# Patient Record
Sex: FEMALE | Race: WHITE | HISPANIC OR LATINO
[De-identification: names, ages, dates, MRNs, and addresses within clinical notes are randomized per-mention and may not be internally consistent; named-entity substitution may affect disease eponyms.]

---

## 2017-03-29 ENCOUNTER — APPOINTMENT (OUTPATIENT)
Dept: ULTRASOUND IMAGING | Facility: HOSPITAL | Age: 82
End: 2017-03-29

## 2017-03-29 ENCOUNTER — APPOINTMENT (OUTPATIENT)
Dept: RADIOLOGY | Facility: HOSPITAL | Age: 82
End: 2017-03-29

## 2017-03-29 ENCOUNTER — OUTPATIENT (OUTPATIENT)
Dept: OUTPATIENT SERVICES | Facility: HOSPITAL | Age: 82
LOS: 1 days | End: 2017-03-29
Payer: MEDICAID

## 2017-03-29 PROCEDURE — 76856 US EXAM PELVIC COMPLETE: CPT

## 2017-03-29 PROCEDURE — 77080 DXA BONE DENSITY AXIAL: CPT

## 2017-03-29 PROCEDURE — 76830 TRANSVAGINAL US NON-OB: CPT

## 2017-04-24 ENCOUNTER — APPOINTMENT (OUTPATIENT)
Dept: ULTRASOUND IMAGING | Facility: HOSPITAL | Age: 82
End: 2017-04-24

## 2017-04-24 ENCOUNTER — OUTPATIENT (OUTPATIENT)
Dept: OUTPATIENT SERVICES | Facility: HOSPITAL | Age: 82
LOS: 1 days | End: 2017-04-24
Payer: MEDICAID

## 2017-04-24 PROCEDURE — 93971 EXTREMITY STUDY: CPT

## 2017-04-24 PROCEDURE — 93971 EXTREMITY STUDY: CPT | Mod: 26,RT

## 2017-05-12 ENCOUNTER — OUTPATIENT (OUTPATIENT)
Dept: OUTPATIENT SERVICES | Facility: HOSPITAL | Age: 82
LOS: 1 days | End: 2017-05-12
Payer: MEDICAID

## 2017-05-12 ENCOUNTER — APPOINTMENT (OUTPATIENT)
Dept: MRI IMAGING | Facility: HOSPITAL | Age: 82
End: 2017-05-12

## 2017-05-12 PROCEDURE — A9579: CPT

## 2017-05-12 PROCEDURE — 72197 MRI PELVIS W/O & W/DYE: CPT

## 2017-07-05 ENCOUNTER — APPOINTMENT (OUTPATIENT)
Dept: ULTRASOUND IMAGING | Facility: HOSPITAL | Age: 82
End: 2017-07-05

## 2017-07-05 ENCOUNTER — OUTPATIENT (OUTPATIENT)
Dept: OUTPATIENT SERVICES | Facility: HOSPITAL | Age: 82
LOS: 1 days | End: 2017-07-05
Payer: MEDICAID

## 2017-07-05 PROCEDURE — 76830 TRANSVAGINAL US NON-OB: CPT

## 2017-07-05 PROCEDURE — 76856 US EXAM PELVIC COMPLETE: CPT

## 2017-07-29 ENCOUNTER — RESULT REVIEW (OUTPATIENT)
Age: 82
End: 2017-07-29

## 2017-08-08 ENCOUNTER — APPOINTMENT (OUTPATIENT)
Dept: ULTRASOUND IMAGING | Facility: HOSPITAL | Age: 82
End: 2017-08-08
Payer: MEDICAID

## 2017-08-08 ENCOUNTER — OUTPATIENT (OUTPATIENT)
Dept: OUTPATIENT SERVICES | Facility: HOSPITAL | Age: 82
LOS: 1 days | End: 2017-08-08
Payer: MEDICAID

## 2017-08-08 PROCEDURE — 76830 TRANSVAGINAL US NON-OB: CPT

## 2017-08-08 PROCEDURE — 76830 TRANSVAGINAL US NON-OB: CPT | Mod: 26

## 2017-10-23 ENCOUNTER — APPOINTMENT (OUTPATIENT)
Dept: ORTHOPEDIC SURGERY | Facility: CLINIC | Age: 82
End: 2017-10-23

## 2018-01-30 ENCOUNTER — OUTPATIENT (OUTPATIENT)
Dept: OUTPATIENT SERVICES | Facility: HOSPITAL | Age: 83
LOS: 1 days | End: 2018-01-30
Payer: MEDICAID

## 2018-01-30 ENCOUNTER — APPOINTMENT (OUTPATIENT)
Dept: ULTRASOUND IMAGING | Facility: HOSPITAL | Age: 83
End: 2018-01-30
Payer: MEDICAID

## 2018-01-30 DIAGNOSIS — R84.9 UNSPECIFIED ABNORMAL FINDING IN SPECIMENS FROM RESPIRATORY ORGANS AND THORAX: ICD-10-CM

## 2018-01-30 PROCEDURE — 76536 US EXAM OF HEAD AND NECK: CPT

## 2018-01-30 PROCEDURE — 76536 US EXAM OF HEAD AND NECK: CPT | Mod: 26

## 2018-03-19 ENCOUNTER — OUTPATIENT (OUTPATIENT)
Dept: OUTPATIENT SERVICES | Facility: HOSPITAL | Age: 83
LOS: 1 days | End: 2018-03-19
Payer: MEDICAID

## 2018-03-19 ENCOUNTER — APPOINTMENT (OUTPATIENT)
Dept: MAMMOGRAPHY | Facility: HOSPITAL | Age: 83
End: 2018-03-19

## 2018-03-19 ENCOUNTER — APPOINTMENT (OUTPATIENT)
Dept: ULTRASOUND IMAGING | Facility: HOSPITAL | Age: 83
End: 2018-03-19
Payer: MEDICAID

## 2018-03-19 DIAGNOSIS — Z00.8 ENCOUNTER FOR OTHER GENERAL EXAMINATION: ICD-10-CM

## 2018-03-19 PROCEDURE — 76830 TRANSVAGINAL US NON-OB: CPT

## 2018-03-19 PROCEDURE — 77067 SCR MAMMO BI INCL CAD: CPT | Mod: 26

## 2018-03-19 PROCEDURE — 76641 ULTRASOUND BREAST COMPLETE: CPT | Mod: 26

## 2018-03-19 PROCEDURE — 77063 BREAST TOMOSYNTHESIS BI: CPT | Mod: 26

## 2018-03-19 PROCEDURE — 76830 TRANSVAGINAL US NON-OB: CPT | Mod: 26

## 2018-03-19 PROCEDURE — 77067 SCR MAMMO BI INCL CAD: CPT

## 2018-03-19 PROCEDURE — 77063 BREAST TOMOSYNTHESIS BI: CPT

## 2018-03-19 PROCEDURE — 76641 ULTRASOUND BREAST COMPLETE: CPT

## 2018-05-08 ENCOUNTER — OUTPATIENT (OUTPATIENT)
Dept: OUTPATIENT SERVICES | Facility: HOSPITAL | Age: 83
LOS: 1 days | End: 2018-05-08
Payer: MEDICAID

## 2018-05-08 ENCOUNTER — APPOINTMENT (OUTPATIENT)
Dept: RADIOLOGY | Facility: HOSPITAL | Age: 83
End: 2018-05-08
Payer: MEDICAID

## 2018-05-08 DIAGNOSIS — Z00.8 ENCOUNTER FOR OTHER GENERAL EXAMINATION: ICD-10-CM

## 2018-05-08 PROCEDURE — 73130 X-RAY EXAM OF HAND: CPT

## 2018-05-08 PROCEDURE — 73130 X-RAY EXAM OF HAND: CPT | Mod: 26,LT

## 2018-05-15 ENCOUNTER — OUTPATIENT (OUTPATIENT)
Dept: OUTPATIENT SERVICES | Facility: HOSPITAL | Age: 83
LOS: 1 days | End: 2018-05-15
Payer: MEDICAID

## 2018-05-15 ENCOUNTER — APPOINTMENT (OUTPATIENT)
Dept: ULTRASOUND IMAGING | Facility: HOSPITAL | Age: 83
End: 2018-05-15
Payer: MEDICAID

## 2018-05-15 DIAGNOSIS — Z00.8 ENCOUNTER FOR OTHER GENERAL EXAMINATION: ICD-10-CM

## 2018-05-15 PROCEDURE — 93880 EXTRACRANIAL BILAT STUDY: CPT | Mod: 26

## 2018-05-15 PROCEDURE — 93880 EXTRACRANIAL BILAT STUDY: CPT

## 2018-05-29 ENCOUNTER — TRANSCRIPTION ENCOUNTER (OUTPATIENT)
Age: 83
End: 2018-05-29

## 2018-06-29 ENCOUNTER — RESULT REVIEW (OUTPATIENT)
Age: 83
End: 2018-06-29

## 2018-07-23 ENCOUNTER — APPOINTMENT (OUTPATIENT)
Dept: ORTHOPEDIC SURGERY | Facility: CLINIC | Age: 83
End: 2018-07-23
Payer: MEDICAID

## 2018-07-23 VITALS
HEART RATE: 68 BPM | BODY MASS INDEX: 19.74 KG/M2 | SYSTOLIC BLOOD PRESSURE: 116 MMHG | WEIGHT: 110 LBS | DIASTOLIC BLOOD PRESSURE: 68 MMHG | HEIGHT: 62.5 IN

## 2018-07-23 DIAGNOSIS — Z87.19 PERSONAL HISTORY OF OTHER DISEASES OF THE DIGESTIVE SYSTEM: ICD-10-CM

## 2018-07-23 DIAGNOSIS — M75.50 BURSITIS OF UNSPECIFIED SHOULDER: ICD-10-CM

## 2018-07-23 DIAGNOSIS — M25.519 PAIN IN UNSPECIFIED SHOULDER: ICD-10-CM

## 2018-07-23 DIAGNOSIS — M19.049 PRIMARY OSTEOARTHRITIS, UNSPECIFIED HAND: ICD-10-CM

## 2018-07-23 PROCEDURE — 73030 X-RAY EXAM OF SHOULDER: CPT | Mod: RT

## 2018-07-23 PROCEDURE — 99214 OFFICE O/P EST MOD 30 MIN: CPT

## 2018-07-23 PROCEDURE — 73130 X-RAY EXAM OF HAND: CPT | Mod: LT

## 2018-07-30 ENCOUNTER — APPOINTMENT (OUTPATIENT)
Dept: ULTRASOUND IMAGING | Facility: HOSPITAL | Age: 83
End: 2018-07-30
Payer: MEDICAID

## 2018-07-30 ENCOUNTER — OUTPATIENT (OUTPATIENT)
Dept: OUTPATIENT SERVICES | Facility: HOSPITAL | Age: 83
LOS: 1 days | End: 2018-07-30
Payer: MEDICAID

## 2018-07-30 DIAGNOSIS — Z00.8 ENCOUNTER FOR OTHER GENERAL EXAMINATION: ICD-10-CM

## 2018-07-30 PROCEDURE — 76770 US EXAM ABDO BACK WALL COMP: CPT

## 2018-07-30 PROCEDURE — 76770 US EXAM ABDO BACK WALL COMP: CPT | Mod: 26

## 2018-10-04 ENCOUNTER — APPOINTMENT (OUTPATIENT)
Dept: ULTRASOUND IMAGING | Facility: HOSPITAL | Age: 83
End: 2018-10-04
Payer: MEDICAID

## 2018-10-04 ENCOUNTER — OUTPATIENT (OUTPATIENT)
Dept: OUTPATIENT SERVICES | Facility: HOSPITAL | Age: 83
LOS: 1 days | End: 2018-10-04
Payer: MEDICAID

## 2018-10-04 DIAGNOSIS — N85.00 ENDOMETRIAL HYPERPLASIA, UNSPECIFIED: ICD-10-CM

## 2018-10-04 PROCEDURE — 76830 TRANSVAGINAL US NON-OB: CPT

## 2018-10-04 PROCEDURE — 76830 TRANSVAGINAL US NON-OB: CPT | Mod: 26

## 2018-11-01 ENCOUNTER — APPOINTMENT (OUTPATIENT)
Dept: CARDIOLOGY | Facility: CLINIC | Age: 83
End: 2018-11-01
Payer: MEDICAID

## 2018-11-01 ENCOUNTER — NON-APPOINTMENT (OUTPATIENT)
Age: 83
End: 2018-11-01

## 2018-11-01 VITALS
DIASTOLIC BLOOD PRESSURE: 75 MMHG | OXYGEN SATURATION: 97 % | WEIGHT: 116 LBS | RESPIRATION RATE: 17 BRPM | HEIGHT: 62 IN | SYSTOLIC BLOOD PRESSURE: 114 MMHG | HEART RATE: 83 BPM | BODY MASS INDEX: 21.35 KG/M2

## 2018-11-01 PROCEDURE — 99244 OFF/OP CNSLTJ NEW/EST MOD 40: CPT

## 2018-11-01 PROCEDURE — 93000 ELECTROCARDIOGRAM COMPLETE: CPT

## 2018-11-26 ENCOUNTER — APPOINTMENT (OUTPATIENT)
Dept: CARDIOLOGY | Facility: CLINIC | Age: 83
End: 2018-11-26

## 2018-12-13 ENCOUNTER — APPOINTMENT (OUTPATIENT)
Dept: OBGYN | Facility: CLINIC | Age: 83
End: 2018-12-13
Payer: MEDICAID

## 2018-12-13 VITALS
HEIGHT: 62 IN | HEART RATE: 78 BPM | DIASTOLIC BLOOD PRESSURE: 76 MMHG | SYSTOLIC BLOOD PRESSURE: 112 MMHG | RESPIRATION RATE: 16 BRPM | WEIGHT: 115 LBS | BODY MASS INDEX: 21.16 KG/M2 | OXYGEN SATURATION: 96 %

## 2018-12-13 DIAGNOSIS — R19.00 INTRA-ABDOMINAL AND PELVIC SWELLING, MASS AND LUMP, UNSPECIFIED SITE: ICD-10-CM

## 2018-12-13 DIAGNOSIS — Z84.81 FAMILY HISTORY OF CARRIER OF GENETIC DISEASE: ICD-10-CM

## 2018-12-13 DIAGNOSIS — Z01.419 ENCOUNTER FOR GYNECOLOGICAL EXAMINATION (GENERAL) (ROUTINE) W/OUT ABNORMAL FINDINGS: ICD-10-CM

## 2018-12-13 DIAGNOSIS — Z63.4 DISAPPEARANCE AND DEATH OF FAMILY MEMBER: ICD-10-CM

## 2018-12-13 PROCEDURE — 99213 OFFICE O/P EST LOW 20 MIN: CPT | Mod: 25

## 2018-12-13 PROCEDURE — 99387 INIT PM E/M NEW PAT 65+ YRS: CPT

## 2018-12-13 SDOH — SOCIAL STABILITY - SOCIAL INSECURITY: DISSAPEARANCE AND DEATH OF FAMILY MEMBER: Z63.4

## 2018-12-14 LAB — HPV HIGH+LOW RISK DNA PNL CVX: NOT DETECTED

## 2018-12-18 ENCOUNTER — APPOINTMENT (OUTPATIENT)
Dept: OBGYN | Facility: CLINIC | Age: 83
End: 2018-12-18
Payer: MEDICAID

## 2018-12-18 ENCOUNTER — ASOB RESULT (OUTPATIENT)
Age: 83
End: 2018-12-18

## 2018-12-18 LAB — CYTOLOGY CVX/VAG DOC THIN PREP: NORMAL

## 2018-12-18 PROCEDURE — 76830 TRANSVAGINAL US NON-OB: CPT

## 2018-12-19 ENCOUNTER — APPOINTMENT (OUTPATIENT)
Dept: CARDIOLOGY | Facility: CLINIC | Age: 83
End: 2018-12-19
Payer: MEDICAID

## 2018-12-19 DIAGNOSIS — I47.1 SUPRAVENTRICULAR TACHYCARDIA: ICD-10-CM

## 2018-12-19 PROCEDURE — 93306 TTE W/DOPPLER COMPLETE: CPT

## 2018-12-19 PROCEDURE — 93015 CV STRESS TEST SUPVJ I&R: CPT

## 2019-01-17 ENCOUNTER — APPOINTMENT (OUTPATIENT)
Dept: OBGYN | Facility: CLINIC | Age: 84
End: 2019-01-17
Payer: MEDICAID

## 2019-01-17 ENCOUNTER — NON-APPOINTMENT (OUTPATIENT)
Age: 84
End: 2019-01-17

## 2019-01-17 ENCOUNTER — APPOINTMENT (OUTPATIENT)
Dept: CARDIOLOGY | Facility: CLINIC | Age: 84
End: 2019-01-17
Payer: MEDICAID

## 2019-01-17 VITALS
RESPIRATION RATE: 17 BRPM | WEIGHT: 111 LBS | DIASTOLIC BLOOD PRESSURE: 82 MMHG | OXYGEN SATURATION: 99 % | BODY MASS INDEX: 20.43 KG/M2 | HEIGHT: 62 IN | SYSTOLIC BLOOD PRESSURE: 132 MMHG | HEART RATE: 71 BPM

## 2019-01-17 VITALS
HEIGHT: 62 IN | SYSTOLIC BLOOD PRESSURE: 116 MMHG | DIASTOLIC BLOOD PRESSURE: 70 MMHG | BODY MASS INDEX: 20.43 KG/M2 | WEIGHT: 111 LBS | OXYGEN SATURATION: 98 % | HEART RATE: 70 BPM

## 2019-01-17 PROCEDURE — 99215 OFFICE O/P EST HI 40 MIN: CPT

## 2019-01-17 PROCEDURE — 99214 OFFICE O/P EST MOD 30 MIN: CPT

## 2019-01-17 PROCEDURE — 93000 ELECTROCARDIOGRAM COMPLETE: CPT

## 2019-01-17 RX ORDER — MELOXICAM 15 MG/1
15 TABLET ORAL DAILY
Qty: 30 | Refills: 1 | Status: DISCONTINUED | COMMUNITY
Start: 2018-07-24 | End: 2019-01-17

## 2019-01-17 NOTE — CHIEF COMPLAINT
[Follow Up] : follow up GYN visit [FreeTextEntry1] : Here today to discuss genetic test result BRCA 1 pos--copy given to pt--extensive rev .\par Pt opt at this time to pursue D/Chysteroscopy for PMPB /EP---if find CA will proceed with NIRMALA/BSO but does not want laparoscopic BSO at time of D/C.\par Pt to see dr. Spangler in consultation as well.\par Disc testing of children/siblings/grandchildren as well.\par Disc risk reducing surgery /surveillance

## 2019-01-17 NOTE — PHYSICAL EXAM
[General Appearance - Well Developed] : well developed [Normal Appearance] : normal appearance [Well Groomed] : well groomed [General Appearance - Well Nourished] : well nourished [No Deformities] : no deformities [General Appearance - In No Acute Distress] : no acute distress [Normal Conjunctiva] : the conjunctiva exhibited no abnormalities [Eyelids - No Xanthelasma] : the eyelids demonstrated no xanthelasmas [Normal Oral Mucosa] : normal oral mucosa [No Oral Pallor] : no oral pallor [No Oral Cyanosis] : no oral cyanosis [Normal Jugular Venous A Waves Present] : normal jugular venous A waves present [Normal Jugular Venous V Waves Present] : normal jugular venous V waves present [No Jugular Venous Serrano A Waves] : no jugular venous serrano A waves [Respiration, Rhythm And Depth] : normal respiratory rhythm and effort [Exaggerated Use Of Accessory Muscles For Inspiration] : no accessory muscle use [Auscultation Breath Sounds / Voice Sounds] : lungs were clear to auscultation bilaterally [Abdomen Soft] : soft [Abdomen Tenderness] : non-tender [Abdomen Mass (___ Cm)] : no abdominal mass palpated [Abnormal Walk] : normal gait [Gait - Sufficient For Exercise Testing] : the gait was sufficient for exercise testing [Nail Clubbing] : no clubbing of the fingernails [Cyanosis, Localized] : no localized cyanosis [Petechial Hemorrhages (___cm)] : no petechial hemorrhages [Skin Color & Pigmentation] : normal skin color and pigmentation [] : no rash [No Venous Stasis] : no venous stasis [Skin Lesions] : no skin lesions [No Skin Ulcers] : no skin ulcer [No Xanthoma] : no  xanthoma was observed [Oriented To Time, Place, And Person] : oriented to person, place, and time [Affect] : the affect was normal [Mood] : the mood was normal [No Anxiety] : not feeling anxious [Normal Rate] : normal [Normal S1] : normal S1 [Normal S2] : normal S2 [S3] : no S3 [S4] : no S4 [No Murmur] : no murmurs heard [Right Carotid Bruit] : no bruit heard over the right carotid [Left Carotid Bruit] : no bruit heard over the left carotid [Right Femoral Bruit] : no bruit heard over the right femoral artery [Left Femoral Bruit] : no bruit heard over the left femoral artery [2+] : left 2+ [No Abnormalities] : the abdominal aorta was not enlarged and no bruit was heard [No Pitting Edema] : no pitting edema present

## 2019-01-17 NOTE — COUNSELING
[Breast Self Exam] : breast self exam [Nutrition] : nutrition [Exercise] : exercise [Vitamins/Supplements] : vitamins/supplements [Pre/Post Op Instructions] : pre/post op instructions

## 2019-01-17 NOTE — CARDIOLOGY SUMMARY
[No Ischemia] : no Ischemia [___] : [unfilled] [LVEF ___%] : LVEF [unfilled]% [None] : no pulmonary hypertension [Enlarged] : enlarged LA size [Moderate] : moderate mitral regurgitation

## 2019-01-17 NOTE — DISCUSSION/SUMMARY
[Procedure Intermediate Risk] : the procedure risk is intermediate [Patient Intermediate Risk] : the patient is an intermediate risk [Optimized for Surgery] : the patient is optimized for surgery [As per surgery] : as per surgery [Continue] : Continue medications as currently directed [FreeTextEntry1] : cardiac monitor intra-op for APCs

## 2019-01-17 NOTE — HISTORY OF PRESENT ILLNESS
[Preoperative Visit] : for a medical evaluation prior to surgery [Scheduled Procedure ___] : a [unfilled] [Date of Surgery ___] : on [unfilled] [Surgeon Name ___] : surgeon: [unfilled] [Fever] : no fever [Chills] : no chills [Fatigue] : no fatigue [Chest Pain] : no chest pain [Cough] : no cough [Dyspnea] : no dyspnea [Dysuria] : no dysuria [Urinary Frequency] : no urinary frequency [Nausea] : no nausea [Vomiting] : no vomiting [Diarrhea] : no diarrhea [Abdominal Pain] : no abdominal pain [Easy Bruising] : no easy bruising [Lower Extremity Swelling] : no lower extremity swelling [Poor Exercise Tolerance] : no poor exercise tolerance [Diabetes] : no diabetes [Pulmonary Disease] : no pulmonary disease [Anti-Platelet Agents] : no anti-platelet agents [Nicotine Dependence] : no nicotine dependence [Alcohol Use] : no  alcohol use [Renal Disease] : no renal disease [GI Disease] : no gastrointestinal disease [Sleep Apnea] : no sleep apnea [Thromboembolic Problems] : no thromboembolic problems [Frequent use of NSAIDs] : no use of NSAIDs [Transfusion Reaction] : no transfusion reaction [Impaired Immunity] : no impaired immunity [Steroid Use in Last 6 Months] : no steroid use in the last six months [Frequent Aspirin Use] : no frequent aspirin use [Prior Anesthesia] : Prior anesthesia [Prev Anesthesia Reaction] : no previous anesthesia reaction [Electrocardiogram] : ~T an ECG ~C was performed [Echocardiogram] : ~T an echocardiogram ~C was performed [Cardiovascular Stress Test] : a cardiac stress test ~T ~C was performed [Good] : Good [Anesthesia Reaction] : no anesthesia reaction [Sudden Death] : no sudden death [Clotting Disorder] : no clotting disorder [Bleeding Disorder] : no bleeding disorder

## 2019-01-21 ENCOUNTER — APPOINTMENT (OUTPATIENT)
Dept: SURGICAL ONCOLOGY | Facility: CLINIC | Age: 84
End: 2019-01-21
Payer: MEDICAID

## 2019-01-21 ENCOUNTER — OTHER (OUTPATIENT)
Age: 84
End: 2019-01-21

## 2019-01-21 VITALS
DIASTOLIC BLOOD PRESSURE: 72 MMHG | HEIGHT: 62 IN | SYSTOLIC BLOOD PRESSURE: 131 MMHG | BODY MASS INDEX: 20.43 KG/M2 | HEART RATE: 70 BPM | WEIGHT: 111 LBS | OXYGEN SATURATION: 99 % | RESPIRATION RATE: 16 BRPM

## 2019-01-21 PROCEDURE — 99244 OFF/OP CNSLTJ NEW/EST MOD 40: CPT

## 2019-01-24 ENCOUNTER — OUTPATIENT (OUTPATIENT)
Dept: OUTPATIENT SERVICES | Facility: HOSPITAL | Age: 84
LOS: 1 days | End: 2019-01-24
Payer: MEDICAID

## 2019-01-24 ENCOUNTER — OUTPATIENT (OUTPATIENT)
Dept: OUTPATIENT SERVICES | Facility: HOSPITAL | Age: 84
LOS: 1 days | Discharge: HOME | End: 2019-01-24

## 2019-01-24 VITALS
SYSTOLIC BLOOD PRESSURE: 130 MMHG | WEIGHT: 108.91 LBS | OXYGEN SATURATION: 99 % | RESPIRATION RATE: 16 BRPM | HEART RATE: 72 BPM | TEMPERATURE: 98 F | DIASTOLIC BLOOD PRESSURE: 80 MMHG | HEIGHT: 62 IN

## 2019-01-24 DIAGNOSIS — H26.9 UNSPECIFIED CATARACT: Chronic | ICD-10-CM

## 2019-01-24 DIAGNOSIS — N84.0 POLYP OF CORPUS UTERI: ICD-10-CM

## 2019-01-24 DIAGNOSIS — K64.9 UNSPECIFIED HEMORRHOIDS: Chronic | ICD-10-CM

## 2019-01-24 DIAGNOSIS — D68.52 PROTHROMBIN GENE MUTATION: ICD-10-CM

## 2019-01-24 DIAGNOSIS — Z90.89 ACQUIRED ABSENCE OF OTHER ORGANS: Chronic | ICD-10-CM

## 2019-01-24 LAB
ANION GAP SERPL CALC-SCNC: 14 MMO/L — SIGNIFICANT CHANGE UP (ref 7–14)
BLD GP AB SCN SERPL QL: NEGATIVE — SIGNIFICANT CHANGE UP
BUN SERPL-MCNC: 16 MG/DL — SIGNIFICANT CHANGE UP (ref 7–23)
CALCIUM SERPL-MCNC: 9.3 MG/DL — SIGNIFICANT CHANGE UP (ref 8.4–10.5)
CHLORIDE SERPL-SCNC: 104 MMOL/L — SIGNIFICANT CHANGE UP (ref 98–107)
CO2 SERPL-SCNC: 23 MMOL/L — SIGNIFICANT CHANGE UP (ref 22–31)
CREAT SERPL-MCNC: 0.85 MG/DL — SIGNIFICANT CHANGE UP (ref 0.5–1.3)
GLUCOSE SERPL-MCNC: 79 MG/DL — SIGNIFICANT CHANGE UP (ref 70–99)
HCT VFR BLD CALC: 38.1 % — SIGNIFICANT CHANGE UP (ref 34.5–45)
HGB BLD-MCNC: 12.1 G/DL — SIGNIFICANT CHANGE UP (ref 11.5–15.5)
MCHC RBC-ENTMCNC: 29 PG — SIGNIFICANT CHANGE UP (ref 27–34)
MCHC RBC-ENTMCNC: 31.8 % — LOW (ref 32–36)
MCV RBC AUTO: 91.4 FL — SIGNIFICANT CHANGE UP (ref 80–100)
NRBC # FLD: 0 K/UL — LOW (ref 25–125)
PLATELET # BLD AUTO: 152 K/UL — SIGNIFICANT CHANGE UP (ref 150–400)
PMV BLD: 11.3 FL — SIGNIFICANT CHANGE UP (ref 7–13)
POTASSIUM SERPL-MCNC: 4.2 MMOL/L — SIGNIFICANT CHANGE UP (ref 3.5–5.3)
POTASSIUM SERPL-SCNC: 4.2 MMOL/L — SIGNIFICANT CHANGE UP (ref 3.5–5.3)
RBC # BLD: 4.17 M/UL — SIGNIFICANT CHANGE UP (ref 3.8–5.2)
RBC # FLD: 13.3 % — SIGNIFICANT CHANGE UP (ref 10.3–14.5)
RH IG SCN BLD-IMP: POSITIVE — SIGNIFICANT CHANGE UP
SODIUM SERPL-SCNC: 141 MMOL/L — SIGNIFICANT CHANGE UP (ref 135–145)
WBC # BLD: 4.3 K/UL — SIGNIFICANT CHANGE UP (ref 3.8–10.5)
WBC # FLD AUTO: 4.3 K/UL — SIGNIFICANT CHANGE UP (ref 3.8–10.5)

## 2019-01-24 PROCEDURE — 93010 ELECTROCARDIOGRAM REPORT: CPT

## 2019-01-24 RX ORDER — SODIUM CHLORIDE 9 MG/ML
1000 INJECTION, SOLUTION INTRAVENOUS
Qty: 0 | Refills: 0 | Status: DISCONTINUED | OUTPATIENT
Start: 2019-02-11 | End: 2019-02-12

## 2019-01-24 NOTE — H&P PST ADULT - NEGATIVE CARDIOVASCULAR SYMPTOMS
no chest pain/no claudication/no palpitations/no orthopnea/no paroxysmal nocturnal dyspnea/no peripheral edema/no dyspnea on exertion

## 2019-01-24 NOTE — H&P PST ADULT - HISTORY OF PRESENT ILLNESS
84y/o female presents for preop op eval for scheduled d&c hysteroscopy, possible symphion on 2/11/19.  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  Pt denies symptoms. 82y/o female presents for preop op eval for scheduled d&c hysteroscopy, possible symphion on 2/11/19.  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  Pt denies symptoms.  Pt speaks limited English.

## 2019-01-24 NOTE — H&P PST ADULT - ATTENDING COMMENTS
Pt BRCA 1 pos found to have endomrtrial polyp for resection.D/C hysteroscopy,poss SMPHOION.Episode staining.Pt aware R/B/A,side effects,compls

## 2019-01-24 NOTE — H&P PST ADULT - PROBLEM SELECTOR PLAN 1
preop instructions, gi prophylaxis given  pt verbalized understanding scheduled d&c hysteroscopy, possible symphion on 2/11/19.   preop instructions, gi prophylaxis given  pt verbalized understanding

## 2019-01-24 NOTE — H&P PST ADULT - FAMILY HISTORY
Child  Still living? Unknown  Family history of diabetes mellitus, Age at diagnosis: Age Unknown     Uncle  Still living? Unknown  Family history of diabetes mellitus, Age at diagnosis: Age Unknown     Mother  Still living? Unknown  Family history of breast cancer, Age at diagnosis: Age Unknown     Sibling  Still living? No  Family history of breast cancer, Age at diagnosis: Age Unknown  Family history of oral cancer, Age at diagnosis: Age Unknown     Father  Still living? No  Family history of emphysema, Age at diagnosis: Age Unknown

## 2019-02-04 ENCOUNTER — APPOINTMENT (OUTPATIENT)
Dept: HEMATOLOGY ONCOLOGY | Facility: CLINIC | Age: 84
End: 2019-02-04
Payer: MEDICAID

## 2019-02-04 VITALS
RESPIRATION RATE: 16 BRPM | HEART RATE: 67 BPM | SYSTOLIC BLOOD PRESSURE: 138 MMHG | HEIGHT: 64.76 IN | WEIGHT: 108.02 LBS | BODY MASS INDEX: 18.22 KG/M2 | OXYGEN SATURATION: 100 % | DIASTOLIC BLOOD PRESSURE: 83 MMHG | TEMPERATURE: 98.6 F

## 2019-02-04 PROCEDURE — 99243 OFF/OP CNSLTJ NEW/EST LOW 30: CPT

## 2019-02-10 ENCOUNTER — TRANSCRIPTION ENCOUNTER (OUTPATIENT)
Age: 84
End: 2019-02-10

## 2019-02-10 NOTE — HISTORY OF PRESENT ILLNESS
[de-identified] : Age 83: Color test done on 12/13/18 showing BRCA 1 pathogenic mutation: [id number]_[id number] (p.[id number])\par She has no history of cancer. Her niece had genetic testing for breast cancer and her sister subsequently had testing which showed BRCA1. She also had testing that showed BRCA 1 and has seen breast surgeon, Dr Spangler. She also has been following with GYN.  [de-identified] : She understands the risk of BRCA positivity for future breast and ovarian cancer but does not want to have prophylactic surgery. She is agreeable to breast surveillance and will be seeing GYN for D & C hysteroscopy. She does not want to take any medication since she also concerned about side effects.

## 2019-02-10 NOTE — CONSULT LETTER
[Dear  ___] : Dear  [unfilled], [Consult Letter:] : I had the pleasure of evaluating your patient, [unfilled]. [( Thank you for referring [unfilled] for consultation for _____ )] : Thank you for referring [unfilled] for consultation for [unfilled] [Please see my note below.] : Please see my note below. [Consult Closing:] : Thank you very much for allowing me to participate in the care of this patient.  If you have any questions, please do not hesitate to contact me. [Sincerely,] : Sincerely, [FreeTextEntry2] : Matheus Spangler MD\par [address] [place] [address]\[place] [address] [FreeTextEntry3] : Tushar Bonner MD\par Attending\par [facility]\par  [DrSonia  ___] : Dr. BURGOS

## 2019-02-10 NOTE — REASON FOR VISIT
[Initial Consultation] : an initial consultation [FreeTextEntry2] : evaluation for BRCA 1 positive and risk reduction

## 2019-02-10 NOTE — REVIEW OF SYSTEMS
[Joint Pain] : joint pain [Joint Stiffness] : joint stiffness [Muscle Pain] : no muscle pain [Muscle Weakness] : no muscle weakness [Negative] : Allergic/Immunologic

## 2019-02-10 NOTE — ASSESSMENT
[FreeTextEntry1] : She is a 82 y/o  F with BRCA 1 mutation present to review risk reduction recommendations. We reviewed breast surveillance which she is willing to do. We reviewed that ovarian cancer screening is limited and rationale for laparoscopic BSO surgery. She will consider further surgery after her D & C. She also is scheduled for MRI of the breast. We reviewed that chemoprevention could be offered to decrease risk of breast cancer but the data behind this recommendation is limited. We reviewed the chemoprevention with raloxifene versus tamoxifen. We reviewed the potential side effects of chemoprevention and she is concerned of decreased bone density and arthralgias. She does not want chemoprevention with endocrine therapy at this time. She will continue to follow with breast surgeon and GYN.

## 2019-02-11 ENCOUNTER — OUTPATIENT (OUTPATIENT)
Dept: OUTPATIENT SERVICES | Facility: HOSPITAL | Age: 84
LOS: 1 days | Discharge: HOME | End: 2019-02-11
Payer: MEDICAID

## 2019-02-11 ENCOUNTER — RESULT REVIEW (OUTPATIENT)
Age: 84
End: 2019-02-11

## 2019-02-11 ENCOUNTER — APPOINTMENT (OUTPATIENT)
Dept: OBGYN | Facility: HOSPITAL | Age: 84
End: 2019-02-11

## 2019-02-11 ENCOUNTER — TRANSCRIPTION ENCOUNTER (OUTPATIENT)
Age: 84
End: 2019-02-11

## 2019-02-11 VITALS
HEART RATE: 67 BPM | SYSTOLIC BLOOD PRESSURE: 138 MMHG | OXYGEN SATURATION: 99 % | WEIGHT: 108.91 LBS | DIASTOLIC BLOOD PRESSURE: 72 MMHG | TEMPERATURE: 98 F | RESPIRATION RATE: 16 BRPM | HEIGHT: 62 IN

## 2019-02-11 VITALS
DIASTOLIC BLOOD PRESSURE: 78 MMHG | OXYGEN SATURATION: 99 % | HEART RATE: 74 BPM | TEMPERATURE: 98 F | RESPIRATION RATE: 17 BRPM | SYSTOLIC BLOOD PRESSURE: 133 MMHG

## 2019-02-11 DIAGNOSIS — H26.9 UNSPECIFIED CATARACT: Chronic | ICD-10-CM

## 2019-02-11 DIAGNOSIS — Z90.89 ACQUIRED ABSENCE OF OTHER ORGANS: Chronic | ICD-10-CM

## 2019-02-11 DIAGNOSIS — K64.9 UNSPECIFIED HEMORRHOIDS: Chronic | ICD-10-CM

## 2019-02-11 DIAGNOSIS — N84.0 POLYP OF CORPUS UTERI: ICD-10-CM

## 2019-02-11 LAB — RH IG SCN BLD-IMP: POSITIVE — SIGNIFICANT CHANGE UP

## 2019-02-11 PROCEDURE — 58558 HYSTEROSCOPY BIOPSY: CPT

## 2019-02-11 PROCEDURE — 88305 TISSUE EXAM BY PATHOLOGIST: CPT | Mod: 26

## 2019-02-11 RX ORDER — CHOLECALCIFEROL (VITAMIN D3) 125 MCG
1 CAPSULE ORAL
Qty: 0 | Refills: 0 | COMMUNITY

## 2019-02-11 RX ORDER — ACETAMINOPHEN 500 MG
2 TABLET ORAL
Qty: 0 | Refills: 0 | COMMUNITY

## 2019-02-11 RX ORDER — ALENDRONATE SODIUM 70 MG/1
1 TABLET ORAL
Qty: 0 | Refills: 0 | COMMUNITY

## 2019-02-11 NOTE — BRIEF OPERATIVE NOTE - OPERATION/FINDINGS
atrophic appearing endometrium.Poss endometrial polyp. Small uterus,no adnexal masses, cervix stenotic

## 2019-02-11 NOTE — ASU DISCHARGE PLAN (ADULT/PEDIATRIC). - MEDICATION SUMMARY - MEDICATIONS TO TAKE
I will START or STAY ON the medications listed below when I get home from the hospital:    refresh tears  -- 1 drop(s) in each eye every 3 hours  -- Indication: For home med    magnesium with calcium  -- 1 tab(s) by mouth once a day  -- Indication: For home med    Tylenol 500 mg oral tablet  -- 2 tab(s) by mouth every 6 hours, As Needed  -- Indication: For for pain/cramping, as needed    Fosamax 70 mg oral tablet  -- 1 tab(s) by mouth once a week  -- Indication: For home med    Vitamin D3 2000 intl units oral tablet  -- 1 tab(s) by mouth once a day  -- Indication: For home med

## 2019-02-11 NOTE — ASU DISCHARGE PLAN (ADULT/PEDIATRIC). - NOTIFY
GYN Fever>100.4/Persistent Nausea and Vomiting/Bleeding that does not stop/Pain not relieved by Medications/Inability to Tolerate Liquids or Foods

## 2019-02-11 NOTE — BRIEF OPERATIVE NOTE - PROCEDURE
<<-----Click on this checkbox to enter Procedure Hysteroscopy with dilation and curettage of uterus  02/11/2019    Active  [facility]

## 2019-02-11 NOTE — DISCHARGE NOTE ADULT - INSTRUCTIONS
Nothing in vagina, no intercourse, no douching, no tampons, no tub baths, and no swimming for 2 weeks or until cleared by M.D. no tylenol or acetominophen until after 4:40pm today no advil motrin ibuprofen products until after 4:50pm today

## 2019-02-11 NOTE — ASU DISCHARGE PLAN (ADULT/PEDIATRIC). - ITEMS TO FOLLOWUP WITH YOUR PHYSICIAN'S
Take Motrin and Tylenol as needed for pain and cramping.  Vaginal spotting for the next couple of days is normal.  If heavy vaginal bleeding, foul smelling discharge, fevers, or pain not controlled with oral pain meds, please contact your provider or go to the emergency room.  Nothing in the vagina for the next two weeks. Please call Dr. Reyna's office for a 2 week follow-up.

## 2019-02-11 NOTE — DISCHARGE NOTE ADULT - NS AS ACTIVITY OBS
No Heavy lifting/straining/Walking-Indoors allowed/Do not make important decisions/Do not drive or operate machinery/Showering allowed no driving operating power tools no drinking alcohol no making important decision for the next 12 hours/Showering allowed/Walking-Indoors allowed/Do not make important decisions/Do not drive or operate machinery/No Heavy lifting/straining

## 2019-02-11 NOTE — DISCHARGE NOTE ADULT - PATIENT PORTAL LINK FT
You can access the [facility], offered by [facility] [place], by registering with the following website: [email or web address]

## 2019-02-11 NOTE — DISCHARGE NOTE ADULT - CONDITIONS AT DISCHARGE
F/U with dr. Reyna in 6-8 wks  [phone] F/U with dr. Reyna in 6-8 wks  [phone]    You received IV Tylenol for pain management at 10:40 AM. Please DO NOT take any Tylenol (Acetaminophen) containing products, such as Vicodin, Percocet, Excedrin, and cold medications for the next 6 hours (until 4:40 PM). DO NOT TAKE MORE THAN 3000 MG OF TYLENOL in a 24 hour period.     You received IV Toradol for pain management at 10:50 AM. Please DO NOT take Motrin/Ibuprofen/Advil/Aleve/NSAIDs (Non-Steroidal Anti-Inflammatory Drugs) for the next 6 hours (until 4:50 PM).

## 2019-02-13 LAB — SURGICAL PATHOLOGY STUDY: SIGNIFICANT CHANGE UP

## 2019-02-15 PROBLEM — N84.0 POLYP OF CORPUS UTERI: Chronic | Status: ACTIVE | Noted: 2019-01-24

## 2019-02-15 PROBLEM — E04.1 NONTOXIC SINGLE THYROID NODULE: Chronic | Status: ACTIVE | Noted: 2019-01-24

## 2019-02-15 PROBLEM — I34.1 NONRHEUMATIC MITRAL (VALVE) PROLAPSE: Chronic | Status: ACTIVE | Noted: 2019-01-24

## 2019-02-15 PROBLEM — M81.0 AGE-RELATED OSTEOPOROSIS WITHOUT CURRENT PATHOLOGICAL FRACTURE: Chronic | Status: ACTIVE | Noted: 2019-01-24

## 2019-02-19 ENCOUNTER — FORM ENCOUNTER (OUTPATIENT)
Age: 84
End: 2019-02-19

## 2019-02-20 ENCOUNTER — APPOINTMENT (OUTPATIENT)
Dept: MRI IMAGING | Facility: CLINIC | Age: 84
End: 2019-02-20
Payer: MEDICAID

## 2019-02-20 ENCOUNTER — OUTPATIENT (OUTPATIENT)
Dept: OUTPATIENT SERVICES | Facility: HOSPITAL | Age: 84
LOS: 1 days | End: 2019-02-20
Payer: MEDICAID

## 2019-02-20 DIAGNOSIS — Z00.8 ENCOUNTER FOR OTHER GENERAL EXAMINATION: ICD-10-CM

## 2019-02-20 DIAGNOSIS — Z90.89 ACQUIRED ABSENCE OF OTHER ORGANS: Chronic | ICD-10-CM

## 2019-02-20 DIAGNOSIS — H26.9 UNSPECIFIED CATARACT: Chronic | ICD-10-CM

## 2019-02-20 DIAGNOSIS — K64.9 UNSPECIFIED HEMORRHOIDS: Chronic | ICD-10-CM

## 2019-02-20 PROCEDURE — C8937: CPT

## 2019-02-20 PROCEDURE — 77049 MRI BREAST C-+ W/CAD BI: CPT | Mod: 26

## 2019-02-20 PROCEDURE — A9585: CPT

## 2019-02-20 PROCEDURE — C8908: CPT

## 2019-02-27 ENCOUNTER — APPOINTMENT (OUTPATIENT)
Dept: GYNECOLOGIC ONCOLOGY | Facility: CLINIC | Age: 84
End: 2019-02-27
Payer: MEDICAID

## 2019-02-27 VITALS
WEIGHT: 108.5 LBS | HEART RATE: 76 BPM | HEIGHT: 62 IN | BODY MASS INDEX: 19.96 KG/M2 | DIASTOLIC BLOOD PRESSURE: 80 MMHG | SYSTOLIC BLOOD PRESSURE: 129 MMHG

## 2019-02-27 DIAGNOSIS — Z80.9 FAMILY HISTORY OF MALIGNANT NEOPLASM, UNSPECIFIED: ICD-10-CM

## 2019-02-27 PROCEDURE — 99205 OFFICE O/P NEW HI 60 MIN: CPT | Mod: 25

## 2019-02-27 PROCEDURE — 58100 BIOPSY OF UTERUS LINING: CPT

## 2019-02-28 NOTE — REASON FOR VISIT
[Initial Consultation] : an initial consultation for [Other: _____] : [unfilled] [FreeTextEntry2] : BRCA1 mutation

## 2019-02-28 NOTE — HISTORY OF PRESENT ILLNESS
[de-identified] : 83-year-old lady referred by her gynecologist Dr. Dede REYNOLDS after recently being found to carry a deleterious mutation of the BRCA1 gene.\par \par Previous breast biopsies: None\par \par Sx/symp: none\par \par Reproductive history:\par Menarche was age 15.\par All 3 pregnancies were delivered, the first at age 21.\par Natural menopause at age 54.\par No hormone replacement therapy\par \par She is presently scheduled for a D&C and hysteroscopy (February 2019) for postmenopausal bleeding & endometrial polyp (Dr. Dede Reynolds), but has declined prophylactic BSO.\par \par March 2018, bilateral, mammograms and ultrasounds @[facility] were unremarkable, BI-RADS 2.\par No prior breast MRI\par \par Mother had breast cancer at age 60.\par A sister also had breast cancer at 60, and carries a deleterious BRCA mutation.\par 2 nieces have also had breast cancer\par \par Her brother had cancer of the tongue.\par \par + Ashkenazi\par \par Her internist is Dr. Deandre Owens.\par \par She has frequent urinary tract infections.\par Her urologist is Dr. Antonio Velasco\par Her nephrologist is Dr. Fide Orlando\par \par In the past she saw a hematologist, Dr. Ash Souza for anemia, that assessment was unremarkable.\par \par \par Cardiologist is Dr. Alexander BEEBE.\par She does not have a pacemaker defibrillator.\par She takes no anticoagulants.\par \par She takes ibuprofen and Meloxicam for arthritis.\par She is on Citracal, And Fosamax for osteoporosis.\par She also takes vitamin D\par \par \par She had a preoperative visit (Above) with her gynecologist Dr. Reynolds in January 2019.\par Has also seen Dr Jennifer Paiz\par \par Colonoscopy of 2016 was unremarkable (Dr Raffaele Bonner)

## 2019-02-28 NOTE — ASSESSMENT
[FreeTextEntry1] : We reviewed her increased risk of breast cancer confirmed by the deleterious BRCA1 mutation, and the options of surgical prophylaxis versus careful surveillance, including breast MRIs, accompanied by evaluation for systemic risk reduction therapy options.\par \par Presently she is not interested in pursuing any surgical intervention.\par \par She is agreeable to baseline breast MRI, that prescription was entered today.\par I submitted the discs from her March 2018 imaging at [facility].\par \par If her MRI is unremarkable, she will have annual mammography in March/April 2019 at [facility]...(Below)\par \par Agreeable to meeting with medical oncology to discuss systemic risk reduction therapy options, nurse navigator was contacted\par \par Reviewed in detail.\par \par All questions answered.\par \par Note dictated\par \par \par 02-10-19:\par She met with Dr. Tushar Bonner from medical oncology, declines systemic breast cancer risk reduction therapy, presently.\par \par \par 02-20-19:\par Baseline breast MRI at [place]: BI-RADS-1.\par Consideration will be given to a followup study at an appropriate interval (genetic predisposition)...............................\par The prescriptions for her March/April 2019 annual mammogram and sonogram were entered on 02/28/19

## 2019-02-28 NOTE — REVIEW OF SYSTEMS
[Negative] : Integumentary [de-identified] : Arthritis [de-identified] : Osteoporosis [FreeTextEntry1] : BRCA positive

## 2019-02-28 NOTE — PHYSICAL EXAM
[Normal] : supple, no neck mass and thyroid not enlarged [Normal Neck Lymph Nodes] : normal neck lymph nodes  [Normal Supraclavicular Lymph Nodes] : normal supraclavicular lymph nodes [Normal Axillary Lymph Nodes] : normal axillary lymph nodes [Normal] : normal appearance, no rash, nodules, vesicles, ulcers, erythema [de-identified] : Groins not examined [de-identified] : below

## 2019-03-13 ENCOUNTER — APPOINTMENT (OUTPATIENT)
Dept: GYNECOLOGIC ONCOLOGY | Facility: CLINIC | Age: 84
End: 2019-03-13
Payer: MEDICAID

## 2019-03-13 VITALS
DIASTOLIC BLOOD PRESSURE: 75 MMHG | SYSTOLIC BLOOD PRESSURE: 128 MMHG | HEART RATE: 77 BPM | HEIGHT: 62 IN | BODY MASS INDEX: 21 KG/M2 | WEIGHT: 114.13 LBS

## 2019-03-13 PROCEDURE — 99214 OFFICE O/P EST MOD 30 MIN: CPT

## 2019-03-20 ENCOUNTER — FORM ENCOUNTER (OUTPATIENT)
Age: 84
End: 2019-03-20

## 2019-03-21 ENCOUNTER — APPOINTMENT (OUTPATIENT)
Dept: ULTRASOUND IMAGING | Facility: CLINIC | Age: 84
End: 2019-03-21
Payer: MEDICAID

## 2019-03-21 ENCOUNTER — OUTPATIENT (OUTPATIENT)
Dept: OUTPATIENT SERVICES | Facility: HOSPITAL | Age: 84
LOS: 1 days | End: 2019-03-21
Payer: MEDICAID

## 2019-03-21 ENCOUNTER — APPOINTMENT (OUTPATIENT)
Dept: MAMMOGRAPHY | Facility: CLINIC | Age: 84
End: 2019-03-21
Payer: MEDICAID

## 2019-03-21 DIAGNOSIS — Z00.00 ENCOUNTER FOR GENERAL ADULT MEDICAL EXAMINATION WITHOUT ABNORMAL FINDINGS: ICD-10-CM

## 2019-03-21 DIAGNOSIS — H26.9 UNSPECIFIED CATARACT: Chronic | ICD-10-CM

## 2019-03-21 DIAGNOSIS — K64.9 UNSPECIFIED HEMORRHOIDS: Chronic | ICD-10-CM

## 2019-03-21 DIAGNOSIS — Z90.89 ACQUIRED ABSENCE OF OTHER ORGANS: Chronic | ICD-10-CM

## 2019-03-21 PROCEDURE — 77067 SCR MAMMO BI INCL CAD: CPT | Mod: 26

## 2019-03-21 PROCEDURE — 77063 BREAST TOMOSYNTHESIS BI: CPT | Mod: 26

## 2019-03-21 PROCEDURE — 77067 SCR MAMMO BI INCL CAD: CPT

## 2019-03-21 PROCEDURE — 76641 ULTRASOUND BREAST COMPLETE: CPT

## 2019-03-21 PROCEDURE — 77063 BREAST TOMOSYNTHESIS BI: CPT

## 2019-03-21 PROCEDURE — 76641 ULTRASOUND BREAST COMPLETE: CPT | Mod: 26,50

## 2019-03-23 LAB
CANCER AG125 SERPL-ACNC: 11 U/ML
CORE LAB BIOPSY: NORMAL

## 2019-03-23 NOTE — PROCEDURE
[Endometrial Biopsy] : an endometrial biopsy [Thickened Endometrium] : thickened endometrium [Patient] : the patient [Written consent] : written consent was obtained prior to the procedure and is detailed in the patient's record [Pelvic Pain] : no pelvic pain [Betadine] : betadine [Yes] : the specimen was sent to pathology [No Complications] : none [Tolerated Well] : the patient tolerated the procedure well

## 2019-03-23 NOTE — LETTER BODY
[Dear  ___] : Dear  [unfilled], [I had the pleasure of evaluating your patient, [unfilled] for ___] : I had the pleasure of evaluating your patient, [unfilled] for [unfilled]. [Attached please find my note.] : Attached please find my note. [FreeTextEntry1] : Pathology

## 2019-03-23 NOTE — DISCUSSION/SUMMARY
[Reviewed Clinical Lab Test(s)] : Results of clinical tests were reviewed. [Reviewed Radiology Report(s)] : Radiology reports were reviewed. [Reviewed Radiology Film/Image(s)] : Images from radiology studies were reviewed and examined. [FreeTextEntry1] : We discussed endometrial thickening and we performed an endometrial biopsy today, will f/u results\par \par We discussed with patient that usually prophylactic BSO and hysterectomy is recommended due to this mutation given increased risk of ovarian and uterine serous carcinoma. Given her age, we are not sure the longevity of the benefits of prophylactic surgery at this time, however we do know that the risk of cancer is still present. patient declines any surgical intervention at this time and wants to return with her son to discuss further. We discussed risk of ovarian, primary peritoneal, and uterine cancer with BRCA 1 mutation and she demonstrated understanding.  used #[id number]\par All questions answered. \par will call with biopsy results

## 2019-03-23 NOTE — HISTORY OF PRESENT ILLNESS
[FreeTextEntry1] : Patient is a 83 y.o. female G 3 P 3 being referred by Dr. Reyna for the management and evaluation of prophylactic BSO for + BRCA, patient returns after endometrial biopsy was performed in the office and returns with son to discuss management for BRCA 1 mutation\par \par Patient presents for consult for possible prophylactic BSO\par D & C no endometrial tissue present 2/2019\par \par PMB denies\par Pain denies\par Change in bowel/bladder habits no change\par Bloating/pressure: occasional bloating depending on meal;\par Not sexually active\par \par 2/11/2019 D & C/Hysteroscopy [[facility]]\par Benign endocervical and squamous mucosa. 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

## 2019-03-23 NOTE — DISCUSSION/SUMMARY
[FreeTextEntry1] : Patient presenting to discuss results of endometrial biopsy and next steps in management for noted finding of BRCA 1 mutation. We discussed that endometrial biopsy did indicate rare strips of endometrial epithelium and given HSC noted atrophic endometrium per my discussion with Dr. Reyna, recommend to continue observation at this time for this findings, unless patient develops  bleeding\par Re. BRCA 1 mutation. We discussed with patient and son that usually prophylactic BSO and hysterectomy is recommended due to this mutation given increased risk of ovarian and uterine serous carcinoma. Given her age, we are not sure the longevity of the benefits of prophylactic surgery at this time, however we do know that the risk of cancer is still present. patient declines any surgical intervention at this time, and wants to continue close f/u. We discussed risk of ovarian, primary peritoneal, and uterine cancer with BRCA 1 mutation and she demonstrated understanding. She wants to continue with conservative management which usually involves f/u surveillance US and \par \par plan for surveillance\par ca 125 today and US in 6 months\par all questions answered with son present

## 2019-03-23 NOTE — HISTORY OF PRESENT ILLNESS
[FreeTextEntry1] : Patient is a 83 y.o. female G 3  P 3  being referred by Dr. Reyna for the management and evaluation of  prophylactic BSO for + BRCA\par \par Patient presents for consult for possible prophylactic BSO\par D & C no endometrial tissue present 2/2019\par \par PMB denies\par Pain denies\par Change in bowel/bladder habits no change\par Bloating/pressure: occasional bloating depending on meal;\par Not sexually active\par \par 2/11/2019 D & C/Hysteroscopy [[facility]]\par Benign endocervical and squamous mucosa. 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

## 2019-03-23 NOTE — PHYSICAL EXAM
[Absent] : CVAT: absent [Normal] : Recto-Vaginal Exam: Normal [de-identified] : 6 wk size nl mobile uterus, no adnexal masses [de-identified] : smooth rectovag septum, no cul de sac nodules, no masses palpable [Fully active, able to carry on all pre-disease performance without restriction] : Status 0 - Fully active, able to carry on all pre-disease performance without restriction

## 2019-03-23 NOTE — PHYSICAL EXAM
[Normal] : General appearance: No acute distress, well appearing and well nourished [Fully active, able to carry on all pre-disease performance without restriction] : Status 0 - Fully active, able to carry on all pre-disease performance without restriction

## 2019-04-11 ENCOUNTER — APPOINTMENT (OUTPATIENT)
Dept: OBGYN | Facility: CLINIC | Age: 84
End: 2019-04-11
Payer: MEDICAID

## 2019-04-11 VITALS
HEART RATE: 68 BPM | SYSTOLIC BLOOD PRESSURE: 112 MMHG | DIASTOLIC BLOOD PRESSURE: 72 MMHG | WEIGHT: 108 LBS | HEIGHT: 62 IN | BODY MASS INDEX: 19.88 KG/M2

## 2019-04-11 DIAGNOSIS — N84.0 POLYP OF CORPUS UTERI: ICD-10-CM

## 2019-04-11 DIAGNOSIS — N95.9 UNSPECIFIED MENOPAUSAL AND PERIMENOPAUSAL DISORDER: ICD-10-CM

## 2019-04-11 PROCEDURE — 99214 OFFICE O/P EST MOD 30 MIN: CPT

## 2019-04-11 NOTE — PHYSICAL EXAM
[Normal] : uterus [Atrophy] : atrophy [Uterine Adnexae] : were not tender and not enlarged [No Bleeding] : there was no active vaginal bleeding

## 2019-04-11 NOTE — CHIEF COMPLAINT
[Follow Up] : follow up GYN visit [FreeTextEntry1] : menopausal disorder,TVS EL less than 4mm with sl irreg--D/C hysteroscopy--see pix--NO EP and ATROPHIC in appearance\par pt NOT interested BSO at this time-BRCA 1 pos,grandchildren getting tested. Pt to [facility] TVS surveillance

## 2019-06-13 ENCOUNTER — APPOINTMENT (OUTPATIENT)
Dept: ULTRASOUND IMAGING | Facility: HOSPITAL | Age: 84
End: 2019-06-13
Payer: MEDICAID

## 2019-06-13 ENCOUNTER — OUTPATIENT (OUTPATIENT)
Dept: OUTPATIENT SERVICES | Facility: HOSPITAL | Age: 84
LOS: 1 days | End: 2019-06-13
Payer: MEDICAID

## 2019-06-13 DIAGNOSIS — H26.9 UNSPECIFIED CATARACT: Chronic | ICD-10-CM

## 2019-06-13 DIAGNOSIS — K64.9 UNSPECIFIED HEMORRHOIDS: Chronic | ICD-10-CM

## 2019-06-13 DIAGNOSIS — Z90.89 ACQUIRED ABSENCE OF OTHER ORGANS: Chronic | ICD-10-CM

## 2019-06-13 DIAGNOSIS — R55 SYNCOPE AND COLLAPSE: ICD-10-CM

## 2019-06-13 PROCEDURE — 93970 EXTREMITY STUDY: CPT

## 2019-06-13 PROCEDURE — 93970 EXTREMITY STUDY: CPT | Mod: 26

## 2019-06-21 ENCOUNTER — APPOINTMENT (OUTPATIENT)
Age: 84
End: 2019-06-21
Payer: MEDICAID

## 2019-06-21 ENCOUNTER — OUTPATIENT (OUTPATIENT)
Dept: OUTPATIENT SERVICES | Facility: HOSPITAL | Age: 84
LOS: 1 days | End: 2019-06-21
Payer: MEDICAID

## 2019-06-21 DIAGNOSIS — K64.9 UNSPECIFIED HEMORRHOIDS: Chronic | ICD-10-CM

## 2019-06-21 DIAGNOSIS — R42 DIZZINESS AND GIDDINESS: ICD-10-CM

## 2019-06-21 DIAGNOSIS — Z90.89 ACQUIRED ABSENCE OF OTHER ORGANS: Chronic | ICD-10-CM

## 2019-06-21 DIAGNOSIS — H26.9 UNSPECIFIED CATARACT: Chronic | ICD-10-CM

## 2019-06-21 PROCEDURE — 93880 EXTRACRANIAL BILAT STUDY: CPT | Mod: 26

## 2019-06-21 PROCEDURE — 93880 EXTRACRANIAL BILAT STUDY: CPT

## 2019-07-24 ENCOUNTER — APPOINTMENT (OUTPATIENT)
Dept: MRI IMAGING | Facility: HOSPITAL | Age: 84
End: 2019-07-24
Payer: MEDICAID

## 2019-07-24 ENCOUNTER — OUTPATIENT (OUTPATIENT)
Dept: OUTPATIENT SERVICES | Facility: HOSPITAL | Age: 84
LOS: 1 days | End: 2019-07-24
Payer: MEDICAID

## 2019-07-24 DIAGNOSIS — H26.9 UNSPECIFIED CATARACT: Chronic | ICD-10-CM

## 2019-07-24 DIAGNOSIS — Z00.8 ENCOUNTER FOR OTHER GENERAL EXAMINATION: ICD-10-CM

## 2019-07-24 DIAGNOSIS — Z90.89 ACQUIRED ABSENCE OF OTHER ORGANS: Chronic | ICD-10-CM

## 2019-07-24 DIAGNOSIS — K64.9 UNSPECIFIED HEMORRHOIDS: Chronic | ICD-10-CM

## 2019-07-24 PROCEDURE — 72148 MRI LUMBAR SPINE W/O DYE: CPT

## 2019-07-24 PROCEDURE — 72148 MRI LUMBAR SPINE W/O DYE: CPT | Mod: 26

## 2019-07-25 ENCOUNTER — NON-APPOINTMENT (OUTPATIENT)
Age: 84
End: 2019-07-25

## 2019-07-25 ENCOUNTER — APPOINTMENT (OUTPATIENT)
Dept: CARDIOLOGY | Facility: CLINIC | Age: 84
End: 2019-07-25
Payer: MEDICAID

## 2019-07-25 VITALS
HEART RATE: 82 BPM | SYSTOLIC BLOOD PRESSURE: 102 MMHG | RESPIRATION RATE: 17 BRPM | DIASTOLIC BLOOD PRESSURE: 74 MMHG | HEIGHT: 62 IN | BODY MASS INDEX: 19.88 KG/M2 | WEIGHT: 108 LBS | OXYGEN SATURATION: 97 %

## 2019-07-25 PROCEDURE — 93000 ELECTROCARDIOGRAM COMPLETE: CPT

## 2019-07-25 PROCEDURE — 99214 OFFICE O/P EST MOD 30 MIN: CPT

## 2019-07-25 NOTE — PHYSICAL EXAM
[General Appearance - Well Developed] : well developed [Normal Appearance] : normal appearance [Well Groomed] : well groomed [General Appearance - Well Nourished] : well nourished [No Deformities] : no deformities [General Appearance - In No Acute Distress] : no acute distress [Normal Conjunctiva] : the conjunctiva exhibited no abnormalities [Eyelids - No Xanthelasma] : the eyelids demonstrated no xanthelasmas [Normal Oral Mucosa] : normal oral mucosa [No Oral Pallor] : no oral pallor [No Oral Cyanosis] : no oral cyanosis [Normal Jugular Venous A Waves Present] : normal jugular venous A waves present [Normal Jugular Venous V Waves Present] : normal jugular venous V waves present [No Jugular Venous Serrano A Waves] : no jugular venous serrano A waves [Respiration, Rhythm And Depth] : normal respiratory rhythm and effort [Exaggerated Use Of Accessory Muscles For Inspiration] : no accessory muscle use [Auscultation Breath Sounds / Voice Sounds] : lungs were clear to auscultation bilaterally [Abdomen Soft] : soft [Abdomen Tenderness] : non-tender [Abdomen Mass (___ Cm)] : no abdominal mass palpated [Abnormal Walk] : normal gait [Gait - Sufficient For Exercise Testing] : the gait was sufficient for exercise testing [Nail Clubbing] : no clubbing of the fingernails [Cyanosis, Localized] : no localized cyanosis [Petechial Hemorrhages (___cm)] : no petechial hemorrhages [Skin Color & Pigmentation] : normal skin color and pigmentation [] : no rash [No Venous Stasis] : no venous stasis [Skin Lesions] : no skin lesions [No Skin Ulcers] : no skin ulcer [No Xanthoma] : no  xanthoma was observed [Oriented To Time, Place, And Person] : oriented to person, place, and time [Affect] : the affect was normal [Mood] : the mood was normal [No Anxiety] : not feeling anxious [Normal Rate] : normal [Normal S1] : normal S1 [Normal S2] : normal S2 [No Murmur] : no murmurs heard [2+] : left 2+ [No Abnormalities] : the abdominal aorta was not enlarged and no bruit was heard [No Pitting Edema] : no pitting edema present [S3] : no S3 [S4] : no S4 [Right Carotid Bruit] : no bruit heard over the right carotid [Left Carotid Bruit] : no bruit heard over the left carotid [Right Femoral Bruit] : no bruit heard over the right femoral artery [Left Femoral Bruit] : no bruit heard over the left femoral artery

## 2019-07-29 ENCOUNTER — APPOINTMENT (OUTPATIENT)
Dept: ORTHOPEDIC SURGERY | Facility: CLINIC | Age: 84
End: 2019-07-29
Payer: MEDICAID

## 2019-07-29 VITALS — HEIGHT: 62 IN | BODY MASS INDEX: 19.88 KG/M2 | WEIGHT: 108 LBS

## 2019-07-29 VITALS — HEART RATE: 80 BPM | SYSTOLIC BLOOD PRESSURE: 101 MMHG | DIASTOLIC BLOOD PRESSURE: 72 MMHG

## 2019-07-29 DIAGNOSIS — Z87.19 PERSONAL HISTORY OF OTHER DISEASES OF THE DIGESTIVE SYSTEM: ICD-10-CM

## 2019-07-29 PROCEDURE — 72100 X-RAY EXAM L-S SPINE 2/3 VWS: CPT

## 2019-07-29 PROCEDURE — 99215 OFFICE O/P EST HI 40 MIN: CPT

## 2019-07-29 NOTE — HISTORY OF PRESENT ILLNESS
[de-identified] : This 84-year-old woman is seen in the office along with her son who at times help as an . 3 weeks ago she was bending over and had the onset of lower back pain with radiation to the right buttock and lower extremity to the calf. The buttock thigh and calf pain is graded as a 9 in the lower back pain as an 8. She has not had associated neurologic symptoms. The pain is no worse coughing or sneezing but it is worse forcing to move her bowels. The pain is no worse sitting or driving but it is worse standing and walking. Treatment was Tylenol and an occasional Aleve. [Pain Location] : pain [Worsening] : worsening [9] : a maximum pain level of 9/10 [Walking] : walking [Standing] : standing

## 2019-07-29 NOTE — REVIEW OF SYSTEMS
[Constipation] : constipation [Diarrhea] : diarrhea [Incontinence] : incontinence [Negative] : Respiratory

## 2019-07-29 NOTE — REASON FOR VISIT
[Initial Visit] : an initial visit for [Degenerative Joint Disease] : degenerative joint disease [Back Pain] : back pain [Radiculopathy] : radiculopathy [Spinal Stenosis] : spinal stenosis [Family Member] : family member

## 2019-07-29 NOTE — PHYSICAL EXAM
[de-identified] : She is fully alert and oriented with a normal mood and affect. She ambulates with a slow gait preferring to use cane in her left hand. There are no cutaneous abnormalities or palpable bony defects of the spine. There is no evidence of shortness of breath or respiratory distress. There is no paravertebral muscle spasm both side bending is limited and painful. There is no sciatic or trochanteric tenderness. Forward flexion of the spine causes right leg pain. Her lower extremity neurological exam revealed a 1+ nature of the right and 2+ on the left. The right ankle jerk was 1-2+ on the left 1+. Motor and sensory exam is normal straight leg raising is negative to 90°. Her knees have a full range of motion with normal stability. Hip range of motion is painless. Vascular exam shows no evidence of varicosities and there is no lymphedema. There are no cutaneous abnormalities of the upper lower extremities. Her upper extremities are normal to inspection and her elbows have a full range of motion with normal motor power and stability. [de-identified] : Prior x-rays were examined that reveals an impacted right femoral neck fracture and an old minor superior endplate compression fracture of T12. AP and lateral x-rays of the lumbar spine reveal multilevel degenerative changes. There is no evidence of new fracture and there are no destructive changes. After the x-rays were done she tells that she had an MRI of the lumbar spine that revealed multilevel disc bulges with moderate to severe stenosis at L3-4 and L4-5.

## 2019-07-29 NOTE — DISCUSSION/SUMMARY
[Medication Risks Reviewed] : Medication risks reviewed [de-identified] : She has lower back pain and symptoms of a lumbar radiculopathy related to degenerative changes and spinal stenosis. I recommended rest and moist heat and she has been started on Naprosyn 500 mg twice a day as a nonsteroidal anti-inflammatory. They will call if there are problems with the medication and I will see her for followup in 3-1/2 weeks.

## 2019-08-08 ENCOUNTER — CHART COPY (OUTPATIENT)
Age: 84
End: 2019-08-08

## 2019-08-12 ENCOUNTER — ASOB RESULT (OUTPATIENT)
Age: 84
End: 2019-08-12

## 2019-08-12 ENCOUNTER — APPOINTMENT (OUTPATIENT)
Dept: OBGYN | Facility: CLINIC | Age: 84
End: 2019-08-12
Payer: MEDICAID

## 2019-08-12 PROCEDURE — 76830 TRANSVAGINAL US NON-OB: CPT

## 2019-08-19 ENCOUNTER — APPOINTMENT (OUTPATIENT)
Dept: ORTHOPEDIC SURGERY | Facility: CLINIC | Age: 84
End: 2019-08-19

## 2019-08-26 ENCOUNTER — APPOINTMENT (OUTPATIENT)
Dept: ORTHOPEDIC SURGERY | Facility: CLINIC | Age: 84
End: 2019-08-26
Payer: MEDICAID

## 2019-08-26 VITALS — WEIGHT: 108 LBS | BODY MASS INDEX: 19.88 KG/M2 | HEIGHT: 62 IN

## 2019-08-26 DIAGNOSIS — M43.17 SPONDYLOLISTHESIS, LUMBOSACRAL REGION: ICD-10-CM

## 2019-08-26 PROCEDURE — 99214 OFFICE O/P EST MOD 30 MIN: CPT

## 2019-08-26 NOTE — REASON FOR VISIT
[Follow-Up Visit] : a follow-up visit for [Degenerative Joint Disease] : degenerative joint disease [Back Pain] : back pain [Radiculopathy] : radiculopathy [Spinal Stenosis] : spinal stenosis

## 2019-08-26 NOTE — DISCUSSION/SUMMARY
[de-identified] : She will discontinue the Naprosyn.  She has been placed on a 10-day prednisone taper which she will start tomorrow.  When the prednisone taper is finished she will start on diclofenac 75 mg twice a day and I will see her for follow-up in 3 weeks.  She will call if there are problems with the medication. [Medication Risks Reviewed] : Medication risks reviewed

## 2019-08-26 NOTE — HISTORY OF PRESENT ILLNESS
[de-identified] : She has not had problems tolerating the Naprosyn.  She has had some minimal improvement of her lower back pain and some very minimal improvement of her right leg symptom.

## 2019-09-11 ENCOUNTER — APPOINTMENT (OUTPATIENT)
Dept: NEUROLOGY | Facility: CLINIC | Age: 84
End: 2019-09-11
Payer: MEDICAID

## 2019-09-11 VITALS
HEIGHT: 62 IN | TEMPERATURE: 98.3 F | OXYGEN SATURATION: 96 % | RESPIRATION RATE: 18 BRPM | HEART RATE: 80 BPM | WEIGHT: 101 LBS | SYSTOLIC BLOOD PRESSURE: 110 MMHG | BODY MASS INDEX: 18.58 KG/M2 | DIASTOLIC BLOOD PRESSURE: 60 MMHG

## 2019-09-11 PROCEDURE — 99204 OFFICE O/P NEW MOD 45 MIN: CPT

## 2019-09-11 RX ORDER — DICLOFENAC SODIUM 75 MG/1
75 TABLET, DELAYED RELEASE ORAL
Qty: 60 | Refills: 0 | Status: DISCONTINUED | COMMUNITY
Start: 2019-08-26 | End: 2019-09-11

## 2019-09-11 NOTE — PHYSICAL EXAM
[FreeTextEntry1] : No orthostasis. She is alert and oriented. Cranial nerves intact except for high-frequency hearing loss in both ears. No murmurs or bruits heard. No focal weakness or sensory loss. Tendon reflexes active and symmetric in upper limbs. Knee jerks are absent. Ankle jerks present and plantar responses are flexor. She has discomfort with palpation of the right sciatic notch. No pain with straight leg raising at 90°. Gait and coordination intact.

## 2019-09-11 NOTE — HISTORY OF PRESENT ILLNESS
[FreeTextEntry1] : 84-year-old woman referred for episodes of syncope. 3 months ago she fell at home. She recalls the fall was associated with palpitations. One month ago she she fainted after arising from the toilet at night a similar episode occurred 5 days ago.\par \par She has a history of mitral regurgitation. She has a history of low back pain. She had MRI 6 weeks ago of the lumbar spine which reported degenerative disc changes. On my review she had a L3-4 disc protrusion with central canal stenosis and foraminal stenosis right greater than left.

## 2019-09-11 NOTE — CONSULT LETTER
[Dear  ___] : Dear  [unfilled], [Consult Letter:] : I had the pleasure of evaluating your patient, [unfilled]. [Please see my note below.] : Please see my note below. [Consult Closing:] : Thank you very much for allowing me to participate in the care of this patient.  If you have any questions, please do not hesitate to contact me. [Sincerely,] : Sincerely, [FreeTextEntry2] : Deandre Owens, DO

## 2019-09-16 ENCOUNTER — APPOINTMENT (OUTPATIENT)
Dept: CARDIOLOGY | Facility: CLINIC | Age: 84
End: 2019-09-16
Payer: MEDICAID

## 2019-09-16 ENCOUNTER — NON-APPOINTMENT (OUTPATIENT)
Age: 84
End: 2019-09-16

## 2019-09-16 VITALS
HEART RATE: 74 BPM | RESPIRATION RATE: 17 BRPM | OXYGEN SATURATION: 100 % | WEIGHT: 104 LBS | HEIGHT: 62 IN | SYSTOLIC BLOOD PRESSURE: 121 MMHG | BODY MASS INDEX: 19.14 KG/M2 | DIASTOLIC BLOOD PRESSURE: 76 MMHG

## 2019-09-16 DIAGNOSIS — R55 SYNCOPE AND COLLAPSE: ICD-10-CM

## 2019-09-16 PROCEDURE — 93000 ELECTROCARDIOGRAM COMPLETE: CPT

## 2019-09-16 PROCEDURE — 99215 OFFICE O/P EST HI 40 MIN: CPT

## 2019-09-16 NOTE — PHYSICAL EXAM
[General Appearance - Well Developed] : well developed [Normal Appearance] : normal appearance [Well Groomed] : well groomed [General Appearance - Well Nourished] : well nourished [No Deformities] : no deformities [General Appearance - In No Acute Distress] : no acute distress [Eyelids - No Xanthelasma] : the eyelids demonstrated no xanthelasmas [Normal Conjunctiva] : the conjunctiva exhibited no abnormalities [Normal Oral Mucosa] : normal oral mucosa [No Oral Pallor] : no oral pallor [Normal Jugular Venous A Waves Present] : normal jugular venous A waves present [No Oral Cyanosis] : no oral cyanosis [Normal Jugular Venous V Waves Present] : normal jugular venous V waves present [No Jugular Venous Serrano A Waves] : no jugular venous serrano A waves [Respiration, Rhythm And Depth] : normal respiratory rhythm and effort [Exaggerated Use Of Accessory Muscles For Inspiration] : no accessory muscle use [Auscultation Breath Sounds / Voice Sounds] : lungs were clear to auscultation bilaterally [Abdomen Soft] : soft [Abdomen Tenderness] : non-tender [Abnormal Walk] : normal gait [Abdomen Mass (___ Cm)] : no abdominal mass palpated [Gait - Sufficient For Exercise Testing] : the gait was sufficient for exercise testing [Nail Clubbing] : no clubbing of the fingernails [Cyanosis, Localized] : no localized cyanosis [Petechial Hemorrhages (___cm)] : no petechial hemorrhages [Skin Color & Pigmentation] : normal skin color and pigmentation [] : no rash [No Venous Stasis] : no venous stasis [No Skin Ulcers] : no skin ulcer [Skin Lesions] : no skin lesions [No Xanthoma] : no  xanthoma was observed [Affect] : the affect was normal [Mood] : the mood was normal [Oriented To Time, Place, And Person] : oriented to person, place, and time [Normal Rate] : normal [No Anxiety] : not feeling anxious [Normal S1] : normal S1 [Normal S2] : normal S2 [No Murmur] : no murmurs heard [No Abnormalities] : the abdominal aorta was not enlarged and no bruit was heard [2+] : left 2+ [No Pitting Edema] : no pitting edema present [S3] : no S3 [S4] : no S4 [Right Carotid Bruit] : no bruit heard over the right carotid [Left Carotid Bruit] : no bruit heard over the left carotid [Right Femoral Bruit] : no bruit heard over the right femoral artery [Left Femoral Bruit] : no bruit heard over the left femoral artery

## 2019-09-16 NOTE — REASON FOR VISIT
[Follow-Up - Clinic] : a clinic follow-up of [FreeTextEntry2] : pt s/p dizziness and fall, then lost concious 6/6/19, still dizziness, a few episodes this month in am after go to bathroom, urinate, then stand up and gets dizziness

## 2019-09-16 NOTE — DISCUSSION/SUMMARY
[Stable] : stable [Echocardiogram] : an echocardiogram [Holter Monitor] : a Holter monitor [de-identified] : neg ett 12/18 [de-identified] : not orthostatic [de-identified] : may need EP eval, ILR

## 2019-09-18 ENCOUNTER — APPOINTMENT (OUTPATIENT)
Dept: GYNECOLOGIC ONCOLOGY | Facility: CLINIC | Age: 84
End: 2019-09-18

## 2019-09-18 ENCOUNTER — APPOINTMENT (OUTPATIENT)
Dept: ORTHOPEDIC SURGERY | Facility: CLINIC | Age: 84
End: 2019-09-18
Payer: MEDICAID

## 2019-09-18 VITALS
DIASTOLIC BLOOD PRESSURE: 88 MMHG | SYSTOLIC BLOOD PRESSURE: 138 MMHG | HEIGHT: 62 IN | WEIGHT: 100 LBS | HEART RATE: 81 BPM | BODY MASS INDEX: 18.4 KG/M2

## 2019-09-18 DIAGNOSIS — M54.16 RADICULOPATHY, LUMBAR REGION: ICD-10-CM

## 2019-09-18 DIAGNOSIS — M47.816 SPONDYLOSIS W/OUT MYELOPATHY OR RADICULOPATHY, LUMBAR REGION: ICD-10-CM

## 2019-09-18 PROCEDURE — 99214 OFFICE O/P EST MOD 30 MIN: CPT

## 2019-09-18 NOTE — HISTORY OF PRESENT ILLNESS
[de-identified] : She had no problems with the prednisone or the nonsteroidal anti-inflammatory.  Lower back pain which was quite severe is now only an intermittent discomfort and worse with walking.  The right leg pain which was even worse is also currently only a discomfort with walking. [Pain Location] : pain [Improving] : improving [2] : a maximum pain level of 2/10 [Intermit.] : ~He/She~ states the symptoms seem to be intermittent

## 2019-09-18 NOTE — DISCUSSION/SUMMARY
[Medication Risks Reviewed] : Medication risks reviewed [de-identified] : She will finish the remaining nonsteroidal and I will then lower the dose of diclofenac to 50 mg twice a day.  I will see her for follow-up in 4 weeks.

## 2019-09-24 ENCOUNTER — NON-APPOINTMENT (OUTPATIENT)
Age: 84
End: 2019-09-24

## 2019-09-24 ENCOUNTER — APPOINTMENT (OUTPATIENT)
Dept: PHYSICAL MEDICINE AND REHAB | Facility: CLINIC | Age: 84
End: 2019-09-24
Payer: MEDICAID

## 2019-09-24 VITALS
BODY MASS INDEX: 19.14 KG/M2 | WEIGHT: 104 LBS | HEIGHT: 62 IN | HEART RATE: 71 BPM | RESPIRATION RATE: 17 BRPM | OXYGEN SATURATION: 100 % | SYSTOLIC BLOOD PRESSURE: 128 MMHG | DIASTOLIC BLOOD PRESSURE: 84 MMHG

## 2019-09-24 DIAGNOSIS — R29.6 REPEATED FALLS: ICD-10-CM

## 2019-09-24 PROCEDURE — 99204 OFFICE O/P NEW MOD 45 MIN: CPT

## 2019-09-24 NOTE — HISTORY OF PRESENT ILLNESS
[FreeTextEntry1] : Patient has had back pain for quite a while, has had therapy in past with relief, but now has had increase in frequency and severity. Patient denies pain for decades, but does recall a fall in 2015 when she was walking outdoors, had fall. She denies having back pain prior to this, but did have balance and gait issues. She states that the next month, she noted difficulty in negotiating stairs (lives on 2nd floor walkup apartment). She went to rheumatologist, Xray taken which reportedly showed impacted femur fx. 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 No operative management was recommended because of nature of impaction and time since injury\par \par Patient continues to have recurrent falls. STates that SERGIO was normal. Saw ortho who diagnosed her with sciatica; recommended NO activity; previously, did activity like yoga, robert chi , walking as part of senior center. Has tingling and numbness in feet. Has worsening pain in buttocks when trying to do stairs, also feels radiating pains to thigh/. When she goes to market, uses shopping cart to assist her. 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 +multilevel  egenerative loss IVD space most pronouned at L4 L5 and L5 S1. L3-4 disc bulge with central zone disc protrusion/extrusion and bilateral facet joint degeneration with impingement right L4 nerve root. L4-5 thickening ligamentum flavum, DJD bilateral facet joints, L5-S1 disc bulge eccentric to right and i bialteral facet joint degeneration/\par \par Patient lives alone, has flight stairs to bedroom and bath, 3 steps to enter. Has HR outside.Has bannister indoors. Has grab bars in shower. Always uses bannister to ascend stair. \par \par Diagnosed with leg length discrepancy, spondylolisthesis L spine, radiculopathy. Patient tried to take cortisone and naproxen but stopped due to SE.Was given diclofenac oral, but didn't take--switched to gel instead with mild relief +aspercreme. Also biofreeze

## 2019-09-24 NOTE — PHYSICAL EXAM
[FreeTextEntry1] : PE: Patient kyphotic posture, thin. alert O x 3\par \par ext: no TTP palpation lumbar and thoracic PS bialterally\par +mild right SI and gluteal TTP\par +visible quad atrophy bilaterally right > left\par +gastroc atrophy right\par +TTP right ITB\par right HF 4+/5 quad 4+/5 ham 4/5\par lef tHF 5/5 quad 5/5 ham 5-/5\par ankle PF and DF. eversion 5/5 bilaterally\par no calf swelling +soft, NT\par \par +right SLR 40 degrees\par reduced right hip IR 15 ER 25 flexion to 95\par left hip IR 25 ER 30\par \par +pelvic tilt/lower on right due to shortening\par +mild gluteal flattening/atrophy right\par \par \par neuro:\par right patellar reflex NA\par left 1+\par NA bilateral ankles

## 2019-09-24 NOTE — ASSESSMENT
[FreeTextEntry1] : \par 84 year old female with PMH right femur fx, OP, leg length discrepancy (acquired), DJD/spondylolisthesis and stenosis L spine, mechanical LBP, radiculopathy, RLE weakness jonah hip.\par \par \par 1.PT 2 x week x 8 weeks for modalities, stretch hip and ITB, RLE strengthening, dynamic balance, gait, stairs and assistive device eval\par \par 2. handicap permit papers completed for patient\par \par 3. possibility gabapentin discussed. Indications, dosing, SE reviewed\par \par 4. MRI reviewed in detail along with exam findings and diagnosis, goals\par \par 5. f/u 2 months. Patient verbalized understanding and agreement with plan

## 2019-09-25 ENCOUNTER — APPOINTMENT (OUTPATIENT)
Dept: GYNECOLOGIC ONCOLOGY | Facility: CLINIC | Age: 84
End: 2019-09-25
Payer: MEDICAID

## 2019-09-25 DIAGNOSIS — Z15.01 GENETIC SUSCEPTIBILITY TO MALIGNANT NEOPLASM OF BREAST: ICD-10-CM

## 2019-09-25 DIAGNOSIS — Z15.09 GENETIC SUSCEPTIBILITY TO MALIGNANT NEOPLASM OF BREAST: ICD-10-CM

## 2019-09-25 PROCEDURE — 99214 OFFICE O/P EST MOD 30 MIN: CPT

## 2019-09-25 RX ORDER — OMEGA-3/DHA/EPA/FISH OIL 300-1000MG
CAPSULE ORAL
Refills: 0 | Status: ACTIVE | COMMUNITY

## 2019-09-26 LAB — CANCER AG125 SERPL-ACNC: 12 U/ML

## 2019-09-27 ENCOUNTER — NON-APPOINTMENT (OUTPATIENT)
Age: 84
End: 2019-09-27

## 2019-09-27 PROCEDURE — 93224 XTRNL ECG REC UP TO 48 HRS: CPT

## 2019-10-18 ENCOUNTER — APPOINTMENT (OUTPATIENT)
Dept: ORTHOPEDIC SURGERY | Facility: CLINIC | Age: 84
End: 2019-10-18

## 2019-10-24 ENCOUNTER — APPOINTMENT (OUTPATIENT)
Dept: CARDIOLOGY | Facility: CLINIC | Age: 84
End: 2019-10-24
Payer: MEDICAID

## 2019-10-24 VITALS
WEIGHT: 104 LBS | HEIGHT: 62 IN | RESPIRATION RATE: 17 BRPM | HEART RATE: 65 BPM | BODY MASS INDEX: 19.14 KG/M2 | DIASTOLIC BLOOD PRESSURE: 81 MMHG | OXYGEN SATURATION: 98 % | SYSTOLIC BLOOD PRESSURE: 125 MMHG

## 2019-10-24 PROCEDURE — 93000 ELECTROCARDIOGRAM COMPLETE: CPT

## 2019-10-24 PROCEDURE — 99215 OFFICE O/P EST HI 40 MIN: CPT | Mod: 25

## 2019-10-24 NOTE — PHYSICAL EXAM
[Normal Appearance] : normal appearance [General Appearance - Well Developed] : well developed [Well Groomed] : well groomed [General Appearance - Well Nourished] : well nourished [No Deformities] : no deformities [General Appearance - In No Acute Distress] : no acute distress [Normal Conjunctiva] : the conjunctiva exhibited no abnormalities [Eyelids - No Xanthelasma] : the eyelids demonstrated no xanthelasmas [Normal Oral Mucosa] : normal oral mucosa [No Oral Pallor] : no oral pallor [Normal Jugular Venous A Waves Present] : normal jugular venous A waves present [No Oral Cyanosis] : no oral cyanosis [No Jugular Venous Serrano A Waves] : no jugular venous serrano A waves [Normal Jugular Venous V Waves Present] : normal jugular venous V waves present [Respiration, Rhythm And Depth] : normal respiratory rhythm and effort [Auscultation Breath Sounds / Voice Sounds] : lungs were clear to auscultation bilaterally [Exaggerated Use Of Accessory Muscles For Inspiration] : no accessory muscle use [Abdomen Soft] : soft [Abdomen Tenderness] : non-tender [Abdomen Mass (___ Cm)] : no abdominal mass palpated [Abnormal Walk] : normal gait [Gait - Sufficient For Exercise Testing] : the gait was sufficient for exercise testing [Nail Clubbing] : no clubbing of the fingernails [Cyanosis, Localized] : no localized cyanosis [Petechial Hemorrhages (___cm)] : no petechial hemorrhages [] : no rash [Skin Color & Pigmentation] : normal skin color and pigmentation [No Venous Stasis] : no venous stasis [Skin Lesions] : no skin lesions [No Skin Ulcers] : no skin ulcer [Oriented To Time, Place, And Person] : oriented to person, place, and time [No Xanthoma] : no  xanthoma was observed [Affect] : the affect was normal [Mood] : the mood was normal [No Anxiety] : not feeling anxious [Normal Rate] : normal [Normal S1] : normal S1 [Normal S2] : normal S2 [No Murmur] : no murmurs heard [2+] : right 2+ [No Pitting Edema] : no pitting edema present [No Abnormalities] : the abdominal aorta was not enlarged and no bruit was heard [S3] : no S3 [S4] : no S4 [Right Carotid Bruit] : no bruit heard over the right carotid [Left Carotid Bruit] : no bruit heard over the left carotid [Right Femoral Bruit] : no bruit heard over the right femoral artery [Left Femoral Bruit] : no bruit heard over the left femoral artery

## 2019-10-24 NOTE — CARDIOLOGY SUMMARY
[No Ischemia] : no Ischemia [___] : [unfilled] [LVEF ___%] : LVEF [unfilled]% [Enlarged] : enlarged LA size [None] : normal LV function [Moderate] : moderate mitral regurgitation

## 2019-10-24 NOTE — DISCUSSION/SUMMARY
[Event Recording] : a patient demand event recording [Stable] : stable [Demand Event Recording] : a patient demand event recording [de-identified] : neg ett 12/18, holter with PAT [de-identified] : pt defers b-blocker, sx better [de-identified] : not orthostatic [de-identified] : may need EP eval, ILR

## 2019-10-25 ENCOUNTER — RX RENEWAL (OUTPATIENT)
Age: 84
End: 2019-10-25

## 2019-11-26 ENCOUNTER — APPOINTMENT (OUTPATIENT)
Dept: PHYSICAL MEDICINE AND REHAB | Facility: CLINIC | Age: 84
End: 2019-11-26
Payer: MEDICAID

## 2019-11-26 VITALS
HEART RATE: 88 BPM | DIASTOLIC BLOOD PRESSURE: 85 MMHG | BODY MASS INDEX: 19.32 KG/M2 | HEIGHT: 62 IN | WEIGHT: 105 LBS | RESPIRATION RATE: 17 BRPM | SYSTOLIC BLOOD PRESSURE: 123 MMHG | OXYGEN SATURATION: 100 %

## 2019-11-26 PROCEDURE — 99214 OFFICE O/P EST MOD 30 MIN: CPT

## 2019-11-26 NOTE — ASSESSMENT
[FreeTextEntry1] : 84 year old female with PMH right femur fx, OP, leg length discrepancy (acquired), DJD/spondylolisthesis and stenosis L spine, mechanical LBP, radiculopathy, RLE weakness jonah hip.. Possible inflammation nerve\par \par 1.Time spent reviewing MRI findings, correlation with exam findings and symptoms, possible inciting factors such as flexion/rotation and hyperextension (pushed up on window sill sash as part of exercise, did a lot of bending and twisting) Ergonomics reviewed\par  ibuprofen 400 mg bid\par \par 2. PT 2 x week x 8 weeks modalities, stretch, postural exercises, ergonomics, HEP restart NEXT week\par -continue shoe lift\par \par 3.f/u 2 months. Patient and family areeable

## 2019-11-26 NOTE — HISTORY OF PRESENT ILLNESS
[FreeTextEntry1] : 84 year old female with PMH right femur fx, OP, leg length discrepancy (acquired), DJD/spondylolisthesis and stenosis L spine, mechanical LBP, radiculopathy, RLE weakness jonah hip. Patient presents with family member who is available to assist in translation\par \par Patient has been going for PT for 7 sessions total. She states that she has had stretching and strengthening with some improvements. However this past weekend had flare of pain. A lot of pain in back and side and front of right leg. Had therapy session again yesterday but this time, felt worsening pain. Same quality of pain and location but more severe. Has worsening pins and needles  Traveling down side of leg to front of foot. 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 +multilevel egenerative loss IVD space most pronouned at L4 L5 and L5 S1. L3-4 disc bulge with central zone disc protrusion/extrusion and bilateral facet joint degeneration with impingement right L4 nerve root. L4-5 thickening ligamentum flavum, DJD bilateral facet joints, L5-S1 disc bulge eccentric to right and i bialteral facet joint degeneration/\par

## 2019-11-26 NOTE — PHYSICAL EXAM
[FreeTextEntry1] : PE: alert seen with family at bedside\par \par ext: no lumbar PS TTP, SSI joint TTP\par no swelling, spasm \par right HF 4+/5 quad 4+/5 ham 5-/5 ankle PF and DF 5/5\par EHL 5/5\par lef tHF, quad 5/5 ham 5.5 ankle PF and DF 5/5\par no calf swelling or pedal edema\par \par tightness in right hip muscles: right HF 85 degrees IR 10 degrees\par +SLR right at 30 degrees, left crossed SLR at 40 degrees\par reflexes: 2+ left knee NA right knee\par NA bilateral ankles\par no sensory deficits LT on exam

## 2019-12-08 ENCOUNTER — TRANSCRIPTION ENCOUNTER (OUTPATIENT)
Age: 84
End: 2019-12-08

## 2019-12-09 ENCOUNTER — OUTPATIENT (OUTPATIENT)
Dept: OUTPATIENT SERVICES | Facility: HOSPITAL | Age: 84
LOS: 1 days | End: 2019-12-09
Payer: MEDICAID

## 2019-12-09 DIAGNOSIS — K62.89 OTHER SPECIFIED DISEASES OF ANUS AND RECTUM: ICD-10-CM

## 2019-12-09 DIAGNOSIS — Z90.89 ACQUIRED ABSENCE OF OTHER ORGANS: Chronic | ICD-10-CM

## 2019-12-09 DIAGNOSIS — H26.9 UNSPECIFIED CATARACT: Chronic | ICD-10-CM

## 2019-12-09 DIAGNOSIS — Z86.010 PERSONAL HISTORY OF COLONIC POLYPS: ICD-10-CM

## 2019-12-09 DIAGNOSIS — K64.9 UNSPECIFIED HEMORRHOIDS: Chronic | ICD-10-CM

## 2019-12-09 PROCEDURE — G0105: CPT

## 2019-12-09 RX ORDER — SODIUM CHLORIDE 9 MG/ML
1000 INJECTION INTRAMUSCULAR; INTRAVENOUS; SUBCUTANEOUS
Refills: 0 | Status: DISCONTINUED | OUTPATIENT
Start: 2019-12-09 | End: 2019-12-28

## 2019-12-09 RX ADMIN — SODIUM CHLORIDE 75 MILLILITER(S): 9 INJECTION INTRAMUSCULAR; INTRAVENOUS; SUBCUTANEOUS at 10:14

## 2019-12-09 NOTE — CHART NOTE - NSCHARTNOTEFT_GEN_A_CORE
COLONOSCOPY REPORT    INDICATION: Personal History of colon polyp, Change in Bowel Habits    ANESTHESIOLOGIST: Paul Christensen MD    MEDICATION: MAC    COMPLICATIONS: none    --------------------------------------------------------------------------------------------------------------------------------------------------------------------    PRE-ANESTHESIA ASSESSMENT:    1. The risks and benefits of the procedure and the sedation options and risks were discussed with the patient.  All questions were answered and informed consent was obtained.    2. Prior to the procedure, a History and Physical was performed, and patient medications, allergies and sensitivities have been reviewed.  The patient's tolerance of preious anesthesia has been reviewed.    3. After obtaining informed consent, the endoscope was passed under direct vision.  Throughout the procedure, the patient's blood pressure, pulse, and oxygen saturation were monitored continuously.  The colonoscope was introduced through the anus and advanced to the cecum/terminal ileum, identified by the appendiceal orifice and ileal-cecal valve.  The colonoscopy was performed without difficulty.  The patient tolerated the procedure well.  The quality of the bowel preparation was good.    FINDINGS:    A. Scattered diverticulosis throughout the colon.  B.  Mucosa appeared grossly unremarkable throughout the colon.  No ulcerations, mass lesions or polyps.  No colitis present.  C. Non-bleeding internal hemorrhoids, moderate in size, visualized upon retroflexion in the rectal vault.    ESTIMATED BLOOD LOSS: 0 cc.    IMPRESSION:  1. Diverticulosis  2. Non-bleeding internal hemorrhoids    RECOMMENDATIONS:  1. High fiber diet

## 2019-12-18 ENCOUNTER — NON-APPOINTMENT (OUTPATIENT)
Age: 84
End: 2019-12-18

## 2019-12-18 ENCOUNTER — APPOINTMENT (OUTPATIENT)
Dept: CARDIOLOGY | Facility: CLINIC | Age: 84
End: 2019-12-18
Payer: MEDICAID

## 2019-12-18 VITALS
DIASTOLIC BLOOD PRESSURE: 79 MMHG | HEIGHT: 62 IN | RESPIRATION RATE: 17 BRPM | SYSTOLIC BLOOD PRESSURE: 133 MMHG | WEIGHT: 105 LBS | OXYGEN SATURATION: 100 % | BODY MASS INDEX: 19.32 KG/M2 | HEART RATE: 64 BPM

## 2019-12-18 DIAGNOSIS — R01.1 CARDIAC MURMUR, UNSPECIFIED: ICD-10-CM

## 2019-12-18 DIAGNOSIS — R94.31 ABNORMAL ELECTROCARDIOGRAM [ECG] [EKG]: ICD-10-CM

## 2019-12-18 DIAGNOSIS — R00.2 PALPITATIONS: ICD-10-CM

## 2019-12-18 DIAGNOSIS — I47.1 SUPRAVENTRICULAR TACHYCARDIA: ICD-10-CM

## 2019-12-18 PROCEDURE — 93306 TTE W/DOPPLER COMPLETE: CPT

## 2019-12-18 PROCEDURE — 99215 OFFICE O/P EST HI 40 MIN: CPT

## 2019-12-18 PROCEDURE — 93000 ELECTROCARDIOGRAM COMPLETE: CPT

## 2019-12-18 NOTE — REASON FOR VISIT
[Follow-Up - Clinic] : a clinic follow-up of [FreeTextEntry2] : pt s/p dizziness and fall, then lost concious 6/6/19, still dizziness, a few episodes this month in am after go to bathroom, urinate, then stand up and gets dizziness, now no complaints, occasional palps, rare and brief

## 2019-12-18 NOTE — CARDIOLOGY SUMMARY
[___] : [unfilled] [No Ischemia] : no Ischemia [LVEF ___%] : LVEF [unfilled]% [None] : no pulmonary hypertension [Moderate] : moderate mitral regurgitation [Enlarged] : enlarged LA size

## 2019-12-18 NOTE — DISCUSSION/SUMMARY
[PAT] : paroxysmal atrial tachycardia [Medication Changes Per Orders] : Medication changes are as documented in orders [Stable] : stable [Improving] : improving [de-identified] : neg ett 12/18, holter with PAT [de-identified] : defers meds, decrease caffeine [de-identified] : may need EP eval, ILR if reoccurs [de-identified] : not orthostatic

## 2019-12-18 NOTE — PHYSICAL EXAM
[General Appearance - Well Developed] : well developed [Normal Appearance] : normal appearance [Well Groomed] : well groomed [General Appearance - Well Nourished] : well nourished [General Appearance - In No Acute Distress] : no acute distress [No Deformities] : no deformities [Eyelids - No Xanthelasma] : the eyelids demonstrated no xanthelasmas [Normal Conjunctiva] : the conjunctiva exhibited no abnormalities [No Oral Pallor] : no oral pallor [Normal Oral Mucosa] : normal oral mucosa [No Oral Cyanosis] : no oral cyanosis [Normal Jugular Venous A Waves Present] : normal jugular venous A waves present [Normal Jugular Venous V Waves Present] : normal jugular venous V waves present [No Jugular Venous Serrano A Waves] : no jugular venous serrano A waves [Respiration, Rhythm And Depth] : normal respiratory rhythm and effort [Auscultation Breath Sounds / Voice Sounds] : lungs were clear to auscultation bilaterally [Exaggerated Use Of Accessory Muscles For Inspiration] : no accessory muscle use [Abdomen Soft] : soft [Abdomen Tenderness] : non-tender [Abdomen Mass (___ Cm)] : no abdominal mass palpated [Abnormal Walk] : normal gait [Gait - Sufficient For Exercise Testing] : the gait was sufficient for exercise testing [Nail Clubbing] : no clubbing of the fingernails [Petechial Hemorrhages (___cm)] : no petechial hemorrhages [Cyanosis, Localized] : no localized cyanosis [] : no rash [Skin Color & Pigmentation] : normal skin color and pigmentation [No Venous Stasis] : no venous stasis [Skin Lesions] : no skin lesions [No Skin Ulcers] : no skin ulcer [No Xanthoma] : no  xanthoma was observed [Affect] : the affect was normal [Oriented To Time, Place, And Person] : oriented to person, place, and time [Mood] : the mood was normal [No Anxiety] : not feeling anxious [Normal S1] : normal S1 [Normal Rate] : normal [Normal S2] : normal S2 [S3] : no S3 [No Murmur] : no murmurs heard [S4] : no S4 [Right Carotid Bruit] : no bruit heard over the right carotid [Left Carotid Bruit] : no bruit heard over the left carotid [Right Femoral Bruit] : no bruit heard over the right femoral artery [Left Femoral Bruit] : no bruit heard over the left femoral artery [2+] : right 2+ [No Pitting Edema] : no pitting edema present [No Abnormalities] : the abdominal aorta was not enlarged and no bruit was heard

## 2020-01-02 ENCOUNTER — APPOINTMENT (OUTPATIENT)
Dept: NEUROLOGY | Facility: CLINIC | Age: 85
End: 2020-01-02
Payer: MEDICAID

## 2020-01-02 VITALS
OXYGEN SATURATION: 99 % | RESPIRATION RATE: 16 BRPM | SYSTOLIC BLOOD PRESSURE: 120 MMHG | DIASTOLIC BLOOD PRESSURE: 76 MMHG | HEART RATE: 68 BPM | HEIGHT: 62 IN | WEIGHT: 106 LBS | TEMPERATURE: 97.8 F | BODY MASS INDEX: 19.51 KG/M2

## 2020-01-02 DIAGNOSIS — M48.062 SPINAL STENOSIS, LUMBAR REGION WITH NEUROGENIC CLAUDICATION: ICD-10-CM

## 2020-01-02 PROCEDURE — 99214 OFFICE O/P EST MOD 30 MIN: CPT

## 2020-01-03 PROBLEM — M48.062 SPINAL STENOSIS OF LUMBAR REGION WITH NEUROGENIC CLAUDICATION: Status: ACTIVE | Noted: 2019-07-29

## 2020-01-03 NOTE — ASSESSMENT
[FreeTextEntry1] : She does not wish to consider surgery for her lumbar stenosis/radiculopathy. Advise urologic opinion for frequent nocturia.

## 2020-01-03 NOTE — HISTORY OF PRESENT ILLNESS
[FreeTextEntry1] : 84-year-old woman seen 4 months ago. She was referred for episodes of syncope.  She fell at home and recalls the fall was associated with palpitations. \par \par She has a history of mitral regurgitation. She has a history of low back pain. She had MRI 6 weeks ago of the lumbar spine which reported degenerative disc changes. On my review she had a L3-4 disc protrusion with central canal stenosis and foraminal stenosis right greater than left.\par \par She reports her right sided sciatica is less with PT. She has pain with prolonged standing or walking .

## 2020-01-03 NOTE — PHYSICAL EXAM
[FreeTextEntry1] : She is alert and oriented. Short term recall is 2/3 words at 3 min. Cranial nerves intact except for high-frequency hearing loss in both ears. No murmurs or bruits heard. No focal weakness or sensory loss. Tendon reflexes active and symmetric in upper limbs. Knee jerks are absent. Ankle jerks present and plantar responses are flexor. She has discomfort with palpation of the right sciatic notch. No pain with straight leg raising at 90°. Gait and coordination intact. \par  \par

## 2020-01-03 NOTE — REVIEW OF SYSTEMS
[As Noted in HPI] : as noted in HPI [Memory Lapses or Loss] : memory loss [Negative] : Heme/Lymph [FreeTextEntry8] : frequent nocturia (Q1-2 hr)

## 2020-01-24 ENCOUNTER — OUTPATIENT (OUTPATIENT)
Dept: OUTPATIENT SERVICES | Facility: HOSPITAL | Age: 85
LOS: 1 days | End: 2020-01-24
Payer: MEDICAID

## 2020-01-24 ENCOUNTER — APPOINTMENT (OUTPATIENT)
Dept: RADIOLOGY | Facility: HOSPITAL | Age: 85
End: 2020-01-24
Payer: MEDICAID

## 2020-01-24 DIAGNOSIS — K64.9 UNSPECIFIED HEMORRHOIDS: Chronic | ICD-10-CM

## 2020-01-24 DIAGNOSIS — H26.9 UNSPECIFIED CATARACT: Chronic | ICD-10-CM

## 2020-01-24 DIAGNOSIS — Z90.89 ACQUIRED ABSENCE OF OTHER ORGANS: Chronic | ICD-10-CM

## 2020-01-24 DIAGNOSIS — Z00.8 ENCOUNTER FOR OTHER GENERAL EXAMINATION: ICD-10-CM

## 2020-01-24 PROCEDURE — 77080 DXA BONE DENSITY AXIAL: CPT | Mod: 26

## 2020-01-24 PROCEDURE — 77080 DXA BONE DENSITY AXIAL: CPT

## 2020-01-25 ENCOUNTER — OUTPATIENT (OUTPATIENT)
Dept: OUTPATIENT SERVICES | Facility: HOSPITAL | Age: 85
LOS: 1 days | End: 2020-01-25
Payer: MEDICAID

## 2020-01-25 ENCOUNTER — APPOINTMENT (OUTPATIENT)
Dept: ULTRASOUND IMAGING | Facility: HOSPITAL | Age: 85
End: 2020-01-25
Payer: MEDICAID

## 2020-01-25 DIAGNOSIS — Z90.89 ACQUIRED ABSENCE OF OTHER ORGANS: Chronic | ICD-10-CM

## 2020-01-25 DIAGNOSIS — H26.9 UNSPECIFIED CATARACT: Chronic | ICD-10-CM

## 2020-01-25 DIAGNOSIS — Z00.8 ENCOUNTER FOR OTHER GENERAL EXAMINATION: ICD-10-CM

## 2020-01-25 DIAGNOSIS — K64.9 UNSPECIFIED HEMORRHOIDS: Chronic | ICD-10-CM

## 2020-01-25 PROCEDURE — 76770 US EXAM ABDO BACK WALL COMP: CPT | Mod: 26

## 2020-01-25 PROCEDURE — 76770 US EXAM ABDO BACK WALL COMP: CPT

## 2020-01-28 ENCOUNTER — APPOINTMENT (OUTPATIENT)
Dept: PHYSICAL MEDICINE AND REHAB | Facility: CLINIC | Age: 85
End: 2020-01-28
Payer: MEDICAID

## 2020-01-28 VITALS
OXYGEN SATURATION: 97 % | HEIGHT: 62 IN | RESPIRATION RATE: 16 BRPM | SYSTOLIC BLOOD PRESSURE: 128 MMHG | DIASTOLIC BLOOD PRESSURE: 84 MMHG | BODY MASS INDEX: 19.51 KG/M2 | WEIGHT: 106 LBS | HEART RATE: 69 BPM

## 2020-01-28 PROCEDURE — 99214 OFFICE O/P EST MOD 30 MIN: CPT

## 2020-01-28 NOTE — ASSESSMENT
[FreeTextEntry1] : 4 year old female with PMH right femur fx, OP, leg length discrepancy (acquired), DJD/spondylolisthesis and stenosis L spine, mechanical LBP, radiculopathy, RLE weakness jonah hip.. Possible inflammation nerve\par \par 1. MRI reviewed with patient via  , results of exam and radicular pain discusse.d Options including possible epidural injection and oral medications such as gabapentin discussed. At this time, patient prefers to start oral options\par \par 2. Begin gabapentin 100 mg qhs. SE and indication discussed. Patient agreeable\par \par 3. continue PT 2 x week x 4 weeks postural exercises, LE strengthening, ergonomics and dc with HEP\par \par 4. referral to pain management for possible epidural \par \par 5. patient agreeable to plan

## 2020-01-28 NOTE — PHYSICAL EXAM
[FreeTextEntry1] : \par \par \par ext: right quad flattening and atrophy\par left knee grinding , moderate, no effusion, no guarding with flex/ext\par \par right HF 4+/5 quad 4+/5 ham 4/5\par left HF 5-/5 quad 4+/5 ham 4+/5\par negative SLR\par no calf swelling _+soft, NT\par ankle PF and DF 5/5\par \par neuro:\par DTRs:\par right patella NA left 1+\par N/A bilateral ankles\par no sensory deficits LT

## 2020-01-28 NOTE — HISTORY OF PRESENT ILLNESS
[FreeTextEntry1] : 84 year old female with PMH right femur fx, OP, leg length discrepancy (acquired), DJD/spondylolisthesis and stenosis L spine, mechanical LBP, radiculopathy, RLE weakness jonah hip.MRI with diffuse osteopenia and multilevel DJD and facet degeneration and right L4 nerve impingement. Patient saw neurology who did not recommend any change to management.\par \par Patient has followed through with therapy. She reports that numbness in extremities have disappeared. However a month ago, had flare up of pain in low back with radiating pain down gluteals and lateral hip. Had massage and stretch and modalities in therapy, however felt it was only temporary. In the morning, she has increased stiffness and difficulty in LE dressing with some improvement with time and movement.  Feels pain after walking from elevator to office (about 50-75 feet). PT has cleared to begin some increased exercise. With hip stretching, improved.  Has not noticed any new weakness or bukcling/dragging of foot. NO falls since last visit.\par \par Had espidoes dizziness in past which has since disappeared. No relief with ibuprofen, tylenol doesn't help. uses biofreeze.\par \par [place]ic language line  used John #[id number] for second part of exam

## 2020-02-18 ENCOUNTER — ASOB RESULT (OUTPATIENT)
Age: 85
End: 2020-02-18

## 2020-02-18 ENCOUNTER — APPOINTMENT (OUTPATIENT)
Dept: OBGYN | Facility: CLINIC | Age: 85
End: 2020-02-18
Payer: MEDICAID

## 2020-02-18 PROCEDURE — 76830 TRANSVAGINAL US NON-OB: CPT

## 2020-02-24 ENCOUNTER — APPOINTMENT (OUTPATIENT)
Dept: NEUROLOGY | Facility: CLINIC | Age: 85
End: 2020-02-24

## 2020-03-25 ENCOUNTER — APPOINTMENT (OUTPATIENT)
Dept: GYNECOLOGIC ONCOLOGY | Facility: CLINIC | Age: 85
End: 2020-03-25

## 2020-05-18 ENCOUNTER — APPOINTMENT (OUTPATIENT)
Dept: ULTRASOUND IMAGING | Facility: HOSPITAL | Age: 85
End: 2020-05-18
Payer: MEDICAID

## 2020-05-18 ENCOUNTER — OUTPATIENT (OUTPATIENT)
Dept: OUTPATIENT SERVICES | Facility: HOSPITAL | Age: 85
LOS: 1 days | End: 2020-05-18
Payer: MEDICAID

## 2020-05-18 DIAGNOSIS — H26.9 UNSPECIFIED CATARACT: Chronic | ICD-10-CM

## 2020-05-18 DIAGNOSIS — Z90.89 ACQUIRED ABSENCE OF OTHER ORGANS: Chronic | ICD-10-CM

## 2020-05-18 DIAGNOSIS — Z00.8 ENCOUNTER FOR OTHER GENERAL EXAMINATION: ICD-10-CM

## 2020-05-18 DIAGNOSIS — K64.9 UNSPECIFIED HEMORRHOIDS: Chronic | ICD-10-CM

## 2020-05-18 PROCEDURE — 93971 EXTREMITY STUDY: CPT

## 2020-05-18 PROCEDURE — 93971 EXTREMITY STUDY: CPT | Mod: 26,LT

## 2020-07-30 ENCOUNTER — APPOINTMENT (OUTPATIENT)
Dept: CARDIOLOGY | Facility: CLINIC | Age: 85
End: 2020-07-30
Payer: MEDICAID

## 2020-07-30 ENCOUNTER — NON-APPOINTMENT (OUTPATIENT)
Age: 85
End: 2020-07-30

## 2020-07-30 VITALS
SYSTOLIC BLOOD PRESSURE: 126 MMHG | WEIGHT: 106 LBS | TEMPERATURE: 98.2 F | HEIGHT: 62 IN | DIASTOLIC BLOOD PRESSURE: 85 MMHG | HEART RATE: 64 BPM | RESPIRATION RATE: 20 BRPM | BODY MASS INDEX: 19.51 KG/M2 | OXYGEN SATURATION: 99 %

## 2020-07-30 DIAGNOSIS — I34.1 NONRHEUMATIC MITRAL (VALVE) PROLAPSE: ICD-10-CM

## 2020-07-30 PROCEDURE — 99214 OFFICE O/P EST MOD 30 MIN: CPT

## 2020-07-30 PROCEDURE — 93000 ELECTROCARDIOGRAM COMPLETE: CPT

## 2020-07-30 NOTE — CARDIOLOGY SUMMARY
[No Ischemia] : no Ischemia [___] : [unfilled] [LVEF ___%] : LVEF [unfilled]% [None] : normal LV function [Enlarged] : enlarged LA size [Moderate] : moderate mitral regurgitation

## 2020-07-30 NOTE — PHYSICAL EXAM
[General Appearance - Well Developed] : well developed [Normal Appearance] : normal appearance [Well Groomed] : well groomed [General Appearance - Well Nourished] : well nourished [No Deformities] : no deformities [General Appearance - In No Acute Distress] : no acute distress [Normal Conjunctiva] : the conjunctiva exhibited no abnormalities [Normal Oral Mucosa] : normal oral mucosa [Eyelids - No Xanthelasma] : the eyelids demonstrated no xanthelasmas [No Oral Cyanosis] : no oral cyanosis [No Oral Pallor] : no oral pallor [Normal Jugular Venous V Waves Present] : normal jugular venous V waves present [Normal Jugular Venous A Waves Present] : normal jugular venous A waves present [No Jugular Venous Serrano A Waves] : no jugular venous serrano A waves [Exaggerated Use Of Accessory Muscles For Inspiration] : no accessory muscle use [Respiration, Rhythm And Depth] : normal respiratory rhythm and effort [Auscultation Breath Sounds / Voice Sounds] : lungs were clear to auscultation bilaterally [Abdomen Soft] : soft [Abdomen Tenderness] : non-tender [Abdomen Mass (___ Cm)] : no abdominal mass palpated [Abnormal Walk] : normal gait [Nail Clubbing] : no clubbing of the fingernails [Gait - Sufficient For Exercise Testing] : the gait was sufficient for exercise testing [Cyanosis, Localized] : no localized cyanosis [Petechial Hemorrhages (___cm)] : no petechial hemorrhages [Skin Color & Pigmentation] : normal skin color and pigmentation [No Venous Stasis] : no venous stasis [] : no rash [Skin Lesions] : no skin lesions [No Xanthoma] : no  xanthoma was observed [No Skin Ulcers] : no skin ulcer [Oriented To Time, Place, And Person] : oriented to person, place, and time [Affect] : the affect was normal [Mood] : the mood was normal [No Anxiety] : not feeling anxious [Normal Rate] : normal [Normal S1] : normal S1 [Normal S2] : normal S2 [No Murmur] : no murmurs heard [2+] : left 2+ [No Pitting Edema] : no pitting edema present [No Abnormalities] : the abdominal aorta was not enlarged and no bruit was heard [S3] : no S3 [S4] : no S4 [Right Carotid Bruit] : no bruit heard over the right carotid [Left Carotid Bruit] : no bruit heard over the left carotid [Right Femoral Bruit] : no bruit heard over the right femoral artery [Left Femoral Bruit] : no bruit heard over the left femoral artery

## 2020-07-30 NOTE — REASON FOR VISIT
[Follow-Up - Clinic] : a clinic follow-up of [FreeTextEntry2] : pt s/p dizziness and fall, then lost concious 6/6/19,  now no complaints, occasional palps, rare and brief

## 2020-08-27 ENCOUNTER — APPOINTMENT (OUTPATIENT)
Dept: PHYSICAL MEDICINE AND REHAB | Facility: CLINIC | Age: 85
End: 2020-08-27
Payer: MEDICAID

## 2020-08-27 VITALS
OXYGEN SATURATION: 97 % | BODY MASS INDEX: 20.43 KG/M2 | SYSTOLIC BLOOD PRESSURE: 131 MMHG | WEIGHT: 111 LBS | HEIGHT: 62 IN | RESPIRATION RATE: 20 BRPM | DIASTOLIC BLOOD PRESSURE: 91 MMHG | TEMPERATURE: 97.3 F | HEART RATE: 76 BPM

## 2020-08-27 DIAGNOSIS — M47.812 SPONDYLOSIS W/OUT MYELOPATHY OR RADICULOPATHY, CERVICAL REGION: ICD-10-CM

## 2020-08-27 DIAGNOSIS — M79.18 MYALGIA, OTHER SITE: ICD-10-CM

## 2020-08-27 PROCEDURE — 99214 OFFICE O/P EST MOD 30 MIN: CPT

## 2020-08-27 NOTE — PHYSICAL EXAM
[FreeTextEntry1] : PE: alert O x 3 no facial droop or dysarthria\par \par cervical: +tightness bilateral upper trap left > right\par mid trap tightness right > left +TP\par +scalene tightness\par cervical ROM : FF 20 ext 5 right lateral flexion < 5 degrees left lateral flexion 10 degrees\par right lateral rotation 10 left 15\par \par ext: varicose veins bilateral LE left > right\par trace non pitting edema left LE no erythema or warmth +soft, calves distensible\par \par no tremor or cogwheeling UE. normal tone\par motor 5/5 bilateral shoulder, elbow flexion and extension 5/5\par wrist flexion and extension 5/5\par right gross grasp 5/5 left 5-/5\par +OA changes IP joints, no swelling\par +thenar flattening\par \par neuro: bilateral bicep 2+ BR 2+ right tricep 2+ left 1+\par denies sensory deficits LT

## 2020-08-27 NOTE — ASSESSMENT
[FreeTextEntry1] : 85  year old female with PMH right femur fx, OP, leg length discrepancy (acquired), DJD/spondylolisthesis and stenosis L spine, mechanical LBP, radiculopathy, RLE weakness jonah hip.. Possible inflammation nerve\par \par 1. PT 2 x week x 8 weeks MHP/TENS to upper traps/cervicla PS, myofascial release, stretch, gentle ROM C spine, extensor strenghtening, postural exercises, ergonomics, pectoral stretch\par \par 2. Xray C spine. If no improvement or develops progressive neurological signs suggestive radiculopathy, MRI C spine, discussed with patient\par \par 3. varciose veins, compression stocking reviewed with patient\par \par 4. f/u 2 months

## 2020-09-01 ENCOUNTER — OUTPATIENT (OUTPATIENT)
Dept: OUTPATIENT SERVICES | Facility: HOSPITAL | Age: 85
LOS: 1 days | End: 2020-09-01
Payer: MEDICAID

## 2020-09-01 ENCOUNTER — APPOINTMENT (OUTPATIENT)
Age: 85
End: 2020-09-01
Payer: MEDICAID

## 2020-09-01 ENCOUNTER — RESULT REVIEW (OUTPATIENT)
Age: 85
End: 2020-09-01

## 2020-09-01 DIAGNOSIS — M47.812 SPONDYLOSIS WITHOUT MYELOPATHY OR RADICULOPATHY, CERVICAL REGION: ICD-10-CM

## 2020-09-01 DIAGNOSIS — K64.9 UNSPECIFIED HEMORRHOIDS: Chronic | ICD-10-CM

## 2020-09-01 DIAGNOSIS — H26.9 UNSPECIFIED CATARACT: Chronic | ICD-10-CM

## 2020-09-01 DIAGNOSIS — Z90.89 ACQUIRED ABSENCE OF OTHER ORGANS: Chronic | ICD-10-CM

## 2020-09-01 PROCEDURE — 72040 X-RAY EXAM NECK SPINE 2-3 VW: CPT

## 2020-09-01 PROCEDURE — 72040 X-RAY EXAM NECK SPINE 2-3 VW: CPT | Mod: 26

## 2020-10-29 ENCOUNTER — APPOINTMENT (OUTPATIENT)
Dept: PHYSICAL MEDICINE AND REHAB | Facility: CLINIC | Age: 85
End: 2020-10-29
Payer: MEDICAID

## 2020-10-29 VITALS
OXYGEN SATURATION: 100 % | RESPIRATION RATE: 20 BRPM | HEART RATE: 91 BPM | HEIGHT: 62 IN | SYSTOLIC BLOOD PRESSURE: 123 MMHG | DIASTOLIC BLOOD PRESSURE: 84 MMHG

## 2020-10-29 DIAGNOSIS — M54.2 CERVICALGIA: ICD-10-CM

## 2020-10-29 PROCEDURE — 99214 OFFICE O/P EST MOD 30 MIN: CPT

## 2020-10-29 PROCEDURE — 99072 ADDL SUPL MATRL&STAF TM PHE: CPT

## 2020-10-29 NOTE — ASSESSMENT
[FreeTextEntry1] : 85 year old female with PMH right femur fx, OP, leg length discrepancy (acquired), DJD/spondylolisthesis and stenosis L spine, mechanical LBP, radiculopathy, RLE weakness jonah hip.. Has noted improvement in cervical symptoms, ROM and pain\par \par 1. Continue PT 2 x week x 8 weeks modalitis, myofascial release, stretch, postural exercises, gentle cervical ROM, shoulder strengthening and HEP\par \par 2. tylenol 500 mg q 4-6 PRN \par \par 3. f/u GI and renal. Recent labs reviewed with patient in detail\par \par 4. f/u 2 months\par

## 2020-10-29 NOTE — HISTORY OF PRESENT ILLNESS
[FreeTextEntry1] : 85 year old female with PMH right femur fx, OP, leg length discrepancy (acquired), DJD/spondylolisthesis and stenosis L spine, mechanical LBP, radiculopathy, RLE weakness jonah hip.. Possible inflammation nerve\par \par Patient seen with language line  Amharic #[id number] Hilaria\par \par Patient reports renal referral for GFR 55.She also has history of frequent UTIs and is currently finishing Abx for infeciton (bactrim 7 days course) Also being followed by GI, had stool for O+P negative, on OB frida and benefiber to help regulate BM.Patient states she feels much better\par \par Patient reports that since her last visit she had tremendous improvement in neck movement. Also states that RUE movement and pain has significantly improved, which she attributes to PT. Does exercises at home. Has only been able to have once a week due to insurance reasons/. Xrary reviewed with patient +DJD and DDD\par \par Patient states XS tylenol has been helpful for pain (500 mg). LFTs 10/15.\par \par

## 2020-10-29 NOTE — PHYSICAL EXAM
[FreeTextEntry1] : PE: alert pleasant NAD O x 3-4\par \par ext: cervical ROM: FF 15 ext <5\par right lateral rotation improved 25 left lateral rotation 25\par \par +TTP bicipital groove and subacromial space\par ROm WFL , mild discomfort riht shoulder\par right shoulder 4+/5 bicep 5/5 extension 5/5 wrist fleixon and ext 5/5\par gross grasp 5-/5\par left grasp 5/5 left UE 5/5\par +IP joints AO changes left II PIP \par \par no calf swelling or pedal edema

## 2020-11-09 ENCOUNTER — APPOINTMENT (OUTPATIENT)
Dept: MAMMOGRAPHY | Facility: HOSPITAL | Age: 85
End: 2020-11-09
Payer: MEDICAID

## 2020-11-09 ENCOUNTER — RESULT REVIEW (OUTPATIENT)
Age: 85
End: 2020-11-09

## 2020-11-09 ENCOUNTER — OUTPATIENT (OUTPATIENT)
Dept: OUTPATIENT SERVICES | Facility: HOSPITAL | Age: 85
LOS: 1 days | End: 2020-11-09
Payer: MEDICAID

## 2020-11-09 ENCOUNTER — APPOINTMENT (OUTPATIENT)
Dept: ULTRASOUND IMAGING | Facility: HOSPITAL | Age: 85
End: 2020-11-09
Payer: MEDICAID

## 2020-11-09 DIAGNOSIS — Z00.8 ENCOUNTER FOR OTHER GENERAL EXAMINATION: ICD-10-CM

## 2020-11-09 DIAGNOSIS — Z90.89 ACQUIRED ABSENCE OF OTHER ORGANS: Chronic | ICD-10-CM

## 2020-11-09 DIAGNOSIS — H26.9 UNSPECIFIED CATARACT: Chronic | ICD-10-CM

## 2020-11-09 DIAGNOSIS — K64.9 UNSPECIFIED HEMORRHOIDS: Chronic | ICD-10-CM

## 2020-11-09 PROCEDURE — 76641 ULTRASOUND BREAST COMPLETE: CPT | Mod: 26,50

## 2020-11-09 PROCEDURE — 77063 BREAST TOMOSYNTHESIS BI: CPT

## 2020-11-09 PROCEDURE — 77067 SCR MAMMO BI INCL CAD: CPT | Mod: 26

## 2020-11-09 PROCEDURE — 77063 BREAST TOMOSYNTHESIS BI: CPT | Mod: 26

## 2020-11-09 PROCEDURE — 77067 SCR MAMMO BI INCL CAD: CPT

## 2020-11-09 PROCEDURE — 76641 ULTRASOUND BREAST COMPLETE: CPT

## 2020-12-16 ENCOUNTER — APPOINTMENT (OUTPATIENT)
Dept: CARDIOLOGY | Facility: CLINIC | Age: 85
End: 2020-12-16
Payer: MEDICAID

## 2020-12-16 ENCOUNTER — NON-APPOINTMENT (OUTPATIENT)
Age: 85
End: 2020-12-16

## 2020-12-16 VITALS
RESPIRATION RATE: 20 BRPM | WEIGHT: 114 LBS | TEMPERATURE: 97.8 F | HEIGHT: 62 IN | HEART RATE: 73 BPM | DIASTOLIC BLOOD PRESSURE: 88 MMHG | BODY MASS INDEX: 20.98 KG/M2 | SYSTOLIC BLOOD PRESSURE: 126 MMHG | OXYGEN SATURATION: 100 %

## 2020-12-16 PROBLEM — Z01.419 ENCOUNTER FOR ANNUAL ROUTINE GYNECOLOGICAL EXAMINATION: Status: RESOLVED | Noted: 2018-12-13 | Resolved: 2020-12-16

## 2020-12-16 PROCEDURE — 99072 ADDL SUPL MATRL&STAF TM PHE: CPT

## 2020-12-16 PROCEDURE — 93000 ELECTROCARDIOGRAM COMPLETE: CPT

## 2020-12-16 PROCEDURE — 99214 OFFICE O/P EST MOD 30 MIN: CPT

## 2020-12-16 NOTE — DISCUSSION/SUMMARY
[Echocardiogram] : echocardiogram [PAT] : paroxysmal atrial tachycardia [Stable] : stable [None] : There are no changes in medication management [Improving] : improving [de-identified] : neg ett 12/18, holter with PAT [de-identified] : johnathon [de-identified] : defers meds, decrease caffeine [de-identified] : not orthostatic [de-identified] : may need EP eval, ILR if reoccurs

## 2021-03-04 ENCOUNTER — APPOINTMENT (OUTPATIENT)
Dept: PHYSICAL MEDICINE AND REHAB | Facility: CLINIC | Age: 86
End: 2021-03-04
Payer: MEDICAID

## 2021-03-04 VITALS
SYSTOLIC BLOOD PRESSURE: 131 MMHG | HEART RATE: 73 BPM | TEMPERATURE: 98 F | OXYGEN SATURATION: 100 % | WEIGHT: 116 LBS | HEIGHT: 62 IN | DIASTOLIC BLOOD PRESSURE: 84 MMHG | RESPIRATION RATE: 18 BRPM | BODY MASS INDEX: 21.35 KG/M2

## 2021-03-04 DIAGNOSIS — M76.32 ILIOTIBIAL BAND SYNDROME, RIGHT LEG: ICD-10-CM

## 2021-03-04 DIAGNOSIS — R29.898 OTHER SYMPTOMS AND SIGNS INVOLVING THE MUSCULOSKELETAL SYSTEM: ICD-10-CM

## 2021-03-04 DIAGNOSIS — M76.31 ILIOTIBIAL BAND SYNDROME, RIGHT LEG: ICD-10-CM

## 2021-03-04 PROCEDURE — 99214 OFFICE O/P EST MOD 30 MIN: CPT

## 2021-03-04 PROCEDURE — 99072 ADDL SUPL MATRL&STAF TM PHE: CPT

## 2021-03-04 RX ORDER — DICLOFENAC SODIUM 50 MG/1
50 TABLET, DELAYED RELEASE ORAL
Qty: 40 | Refills: 0 | Status: DISCONTINUED | COMMUNITY
Start: 2019-09-18 | End: 2021-03-04

## 2021-03-04 RX ORDER — ACETAMINOPHEN 500 MG/1
500 TABLET, COATED ORAL 4 TIMES DAILY
Qty: 120 | Refills: 0 | Status: ACTIVE | COMMUNITY
Start: 2020-10-29 | End: 1900-01-01

## 2021-03-04 RX ORDER — PREDNISONE 20 MG/1
20 TABLET ORAL
Qty: 26 | Refills: 0 | Status: DISCONTINUED | COMMUNITY
Start: 2019-08-26 | End: 2021-03-04

## 2021-03-04 RX ORDER — GABAPENTIN 100 MG/1
100 CAPSULE ORAL
Qty: 30 | Refills: 2 | Status: DISCONTINUED | COMMUNITY
Start: 2020-01-28 | End: 2021-03-04

## 2021-03-04 RX ORDER — IBUPROFEN 400 MG/1
400 TABLET, FILM COATED ORAL
Qty: 30 | Refills: 1 | Status: DISCONTINUED | COMMUNITY
Start: 2019-11-26 | End: 2021-03-04

## 2021-03-04 RX ORDER — NAPROXEN 500 MG/1
500 TABLET ORAL
Qty: 60 | Refills: 0 | Status: DISCONTINUED | COMMUNITY
Start: 2019-07-29 | End: 2021-03-04

## 2021-03-04 RX ORDER — ALENDRONATE SODIUM 70 MG/1
70 TABLET ORAL
Refills: 0 | Status: DISCONTINUED | COMMUNITY
End: 2021-03-04

## 2021-03-04 NOTE — ASSESSMENT
[FreeTextEntry1] : \par 86 year old female with PMH right femur fx, OP, leg length discrepancy (acquired), DJD/spondylolisthesis and stenosis L spine, mechanical LBP, radiculopathy, RLE weakness with recurrent right lateral leg pain, greater trochanteric bursa TTP and worsening gait\par \par 1. .PT 2 x week x 8 weeks MHP/TENS/US to bilateral ITB, stretch hip jonah right, gluteals. hip.gluteal/quad and ham strengthening,m transfer, dynamic balance, gait HEP\par \par 2. continue tylenol 500 mg q6 PRN . Renewed\par \par 3. Monitor neurologic status. Options for further workup including MRI or EMG discussed, however patient 's pain is manageable with medications at this point and not interested in surgery\par \par 4. continue stretching upper back./C spine HEP\par \par 5. f/u 2 months

## 2021-03-04 NOTE — HISTORY OF PRESENT ILLNESS
[FreeTextEntry1] : 86 year old female with PMH right femur fx, OP, leg length discrepancy (acquired), DJD/spondylolisthesis and stenosis L spine, mechanical LBP, radiculopathy, RLE weakness jonah hip.. Has noted improvement in cervical symptoms, ROM and pain. Patient was last seen 10/29 and states that she didn't have any therapy for last 2 months because she was away. At that time, her back symptoms had nearly resolved. She now presents not with cervical symptoms but recurrence of right LE rigidity and stiffness, pain along right lateral leg. Describes as muscle "something" Pain starts from proximal lateral hip down to 5 cm distal knee. Patient states that she has had episodes where she falls to right; hard to say whether weakness but has periods of loss of balance to right ; also feels she needs to lace shoes up higher to give beter support on right. No left le symptoms.\par \par Takes tylenol XS 1-2 tabs in a day if needed depended on activity.

## 2021-03-04 NOTE — PHYSICAL EXAM
[FreeTextEntry1] : PE  Patient alert, pleasant, transfers slowly, kyphotic posture, no antalgic gait, short stride length, reduced extension knees/hi pflexors\par \par ext: BUE normal tone and ROM no pain with ROM\par +OA changes bilateral fingers\par bilateral shoulder, elbow flexion, elbow extension 5/5 gross grasp 5-/5 (arthritis per patient)\par wrist flexion and extension 5/5\par \par right HF 5-/5 quad 4+/5 ham 5-/5 ankle PF 5-/5 DF 5-/5\par left HF 5/5 quad 5/5 ankle PF and DF 5/5\par negative SLR\par no sensory deficits LT\par DTRs 2+ bilateral knees\par \par flattening right quad/mild atrophy right compared to left\par tightness right lateral hip PROM IR 15 right left 30\par right hip flexion 95 no pain with compression\par +TTP along ITB bilaterally right > left +TTP right greater troch bursa

## 2021-03-17 ENCOUNTER — APPOINTMENT (OUTPATIENT)
Dept: CARDIOLOGY | Facility: CLINIC | Age: 86
End: 2021-03-17
Payer: MEDICAID

## 2021-03-17 ENCOUNTER — NON-APPOINTMENT (OUTPATIENT)
Age: 86
End: 2021-03-17

## 2021-03-17 VITALS
RESPIRATION RATE: 19 BRPM | WEIGHT: 115 LBS | HEIGHT: 62 IN | DIASTOLIC BLOOD PRESSURE: 95 MMHG | OXYGEN SATURATION: 99 % | TEMPERATURE: 96.3 F | HEART RATE: 74 BPM | SYSTOLIC BLOOD PRESSURE: 151 MMHG | BODY MASS INDEX: 21.16 KG/M2

## 2021-03-17 VITALS — DIASTOLIC BLOOD PRESSURE: 88 MMHG | SYSTOLIC BLOOD PRESSURE: 130 MMHG

## 2021-03-17 PROCEDURE — 93306 TTE W/DOPPLER COMPLETE: CPT

## 2021-03-17 PROCEDURE — 93000 ELECTROCARDIOGRAM COMPLETE: CPT

## 2021-03-17 PROCEDURE — 99072 ADDL SUPL MATRL&STAF TM PHE: CPT

## 2021-03-17 PROCEDURE — 99214 OFFICE O/P EST MOD 30 MIN: CPT

## 2021-03-18 NOTE — DISCUSSION/SUMMARY
[Echocardiogram] : echocardiogram [PAT] : paroxysmal atrial tachycardia [Stable] : stable [None] : There are no changes in medication management [Improving] : improving [de-identified] : neg ett 12/18, holter with PAT [de-identified] : johnathon [de-identified] : defers meds, decrease caffeine [de-identified] : not orthostatic [de-identified] : may need EP eval, ILR if reoccurs

## 2021-03-30 ENCOUNTER — APPOINTMENT (OUTPATIENT)
Dept: OTOLARYNGOLOGY | Facility: CLINIC | Age: 86
End: 2021-03-30
Payer: MEDICAID

## 2021-03-30 VITALS
DIASTOLIC BLOOD PRESSURE: 80 MMHG | SYSTOLIC BLOOD PRESSURE: 130 MMHG | HEIGHT: 62 IN | HEART RATE: 92 BPM | OXYGEN SATURATION: 96 % | BODY MASS INDEX: 21.16 KG/M2 | TEMPERATURE: 98.1 F | WEIGHT: 115 LBS

## 2021-03-30 DIAGNOSIS — K14.8 OTHER DISEASES OF TONGUE: ICD-10-CM

## 2021-03-30 PROCEDURE — 31231 NASAL ENDOSCOPY DX: CPT

## 2021-03-30 PROCEDURE — 99203 OFFICE O/P NEW LOW 30 MIN: CPT | Mod: 25

## 2021-03-30 PROCEDURE — 99072 ADDL SUPL MATRL&STAF TM PHE: CPT

## 2021-03-30 NOTE — PHYSICAL EXAM
[Nasal Endoscopy Performed] : nasal endoscopy was performed, see procedure section for findings [Midline] : trachea located in midline position [Normal] : no rashes [de-identified] : R lateral tongue with purple, benign-appearing lesion, non firm, non painful

## 2021-03-30 NOTE — HISTORY OF PRESENT ILLNESS
[de-identified] : 86F referred by Dr. Owens (PCP) for evaluation of a R-sided tongue lesion\par Noted on exam\par Asymptomatic-- patient denies related pain or other issues\par Tends to experience dry mouth\par Long hx of smell self-resolving lesions on lip mucosa\par Denies odynophagia, dysphagia, weight loss

## 2021-03-30 NOTE — CONSULT LETTER
[Dear  ___] : Dear  [unfilled], [( Thank you for referring [unfilled] for consultation for _____ )] : Thank you for referring [unfilled] for consultation for [unfilled] [Please see my note below.] : Please see my note below. [Consult Closing:] : Thank you very much for allowing me to participate in the care of this patient.  If you have any questions, please do not hesitate to contact me. [Sincerely,] : Sincerely, [FreeTextEntry3] : Juan Pablo Velazquez MD\par Sinus & Endoscopic Skull Base Surgery\par Department of Otolaryngology- Head & Neck Surgery\par [facility]\par [facility]\par \par Phone: [phone]\par Fax: [phone]\par

## 2021-04-08 ENCOUNTER — APPOINTMENT (OUTPATIENT)
Dept: OBGYN | Facility: CLINIC | Age: 86
End: 2021-04-08

## 2021-05-06 ENCOUNTER — APPOINTMENT (OUTPATIENT)
Dept: PHYSICAL MEDICINE AND REHAB | Facility: CLINIC | Age: 86
End: 2021-05-06

## 2022-02-16 ENCOUNTER — APPOINTMENT (RX ONLY)
Dept: URBAN - METROPOLITAN AREA CLINIC 141 | Facility: CLINIC | Age: 87
Setting detail: DERMATOLOGY
End: 2022-02-16

## 2022-02-16 DIAGNOSIS — D18.0 HEMANGIOMA: ICD-10-CM

## 2022-02-16 DIAGNOSIS — L20.89 OTHER ATOPIC DERMATITIS: ICD-10-CM

## 2022-02-16 DIAGNOSIS — L72.0 EPIDERMAL CYST: ICD-10-CM

## 2022-02-16 DIAGNOSIS — L82.1 OTHER SEBORRHEIC KERATOSIS: ICD-10-CM

## 2022-02-16 DIAGNOSIS — L57.8 OTHER SKIN CHANGES DUE TO CHRONIC EXPOSURE TO NONIONIZING RADIATION: ICD-10-CM

## 2022-02-16 DIAGNOSIS — Q27.8 OTHER SPECIFIED CONGENITAL MALFORMATIONS OF PERIPHERAL VASCULAR SYSTEM: ICD-10-CM

## 2022-02-16 DIAGNOSIS — Z85.828 PERSONAL HISTORY OF OTHER MALIGNANT NEOPLASM OF SKIN: ICD-10-CM

## 2022-02-16 PROBLEM — D18.01 HEMANGIOMA OF SKIN AND SUBCUTANEOUS TISSUE: Status: ACTIVE | Noted: 2022-02-16

## 2022-02-16 PROBLEM — L20.84 INTRINSIC (ALLERGIC) ECZEMA: Status: ACTIVE | Noted: 2022-02-16

## 2022-02-16 PROCEDURE — ? ADDITIONAL NOTES

## 2022-02-16 PROCEDURE — 99204 OFFICE O/P NEW MOD 45 MIN: CPT

## 2022-02-16 PROCEDURE — ? FULL BODY SKIN EXAM - DECLINED

## 2022-02-16 PROCEDURE — ? PRESCRIPTION

## 2022-02-16 PROCEDURE — ? COUNSELING

## 2022-02-16 RX ORDER — TRIAMCINOLONE ACETONIDE 1 MG/G
CREAM TOPICAL BID
Qty: 454 | Refills: 3 | Status: ERX

## 2022-02-16 ASSESSMENT — LOCATION ZONE DERM
LOCATION ZONE: EAR
LOCATION ZONE: HAND
LOCATION ZONE: TRUNK
LOCATION ZONE: FACE
LOCATION ZONE: ARM
LOCATION ZONE: FACE

## 2022-02-16 ASSESSMENT — LOCATION SIMPLE DESCRIPTION DERM
LOCATION SIMPLE: RIGHT FOREHEAD
LOCATION SIMPLE: ABDOMEN
LOCATION SIMPLE: LEFT EAR
LOCATION SIMPLE: LEFT FOREARM
LOCATION SIMPLE: LEFT UPPER BACK
LOCATION SIMPLE: RIGHT CHEEK
LOCATION SIMPLE: UPPER BACK
LOCATION SIMPLE: RIGHT UPPER ARM
LOCATION SIMPLE: CHEST
LOCATION SIMPLE: LEFT HAND
LOCATION SIMPLE: LOWER BACK

## 2022-02-16 ASSESSMENT — LOCATION DETAILED DESCRIPTION DERM
LOCATION DETAILED: RIGHT CENTRAL MALAR CHEEK
LOCATION DETAILED: LEFT RADIAL DORSAL HAND
LOCATION DETAILED: INFERIOR THORACIC SPINE
LOCATION DETAILED: RIGHT LATERAL ABDOMEN
LOCATION DETAILED: SUPERIOR LUMBAR SPINE
LOCATION DETAILED: RIGHT MEDIAL SUPERIOR CHEST
LOCATION DETAILED: LEFT SUPERIOR HELIX
LOCATION DETAILED: RIGHT MEDIAL FOREHEAD
LOCATION DETAILED: EPIGASTRIC SKIN
LOCATION DETAILED: RIGHT ANTERIOR DISTAL UPPER ARM
LOCATION DETAILED: LEFT MEDIAL UPPER BACK
LOCATION DETAILED: LEFT PROXIMAL DORSAL FOREARM

## 2022-02-16 NOTE — PROCEDURE: FULL BODY SKIN EXAM - DECLINED
Price (Do Not Change): 0.00
Instructions: This plan will send the code FBSD to the PM system.  DO NOT or CHANGE the price.
Body Of Note (Please Add Your Own Text Here): Patient not due, needs to schedule appropriately when due for FBSE.
Detail Level: Simple

## 2022-04-06 NOTE — ASSESSMENT
[FreeTextEntry1] : Recurrent syncope most likely based on a cardiac etiology. Advised followup with cardiologist. Consider Holter monitor.\par \par Return for followup in 3 months. Cell Phone/PDA (specify)/Clothing

## 2022-05-05 NOTE — REVIEW OF SYSTEMS
[Recent Weight Loss (___ Lbs)] : recent [unfilled] ~Ulb weight loss [Incontinence] : incontinence [As Noted in HPI] : as noted in HPI [Arthralgias] : arthralgias [Negative] : Heme/Lymph <-- Click to add NO significant Past Surgical History

## 2022-06-28 ENCOUNTER — APPOINTMENT (RX ONLY)
Dept: URBAN - METROPOLITAN AREA CLINIC 141 | Facility: CLINIC | Age: 87
Setting detail: DERMATOLOGY
End: 2022-06-28

## 2022-06-28 DIAGNOSIS — Q27.8 OTHER SPECIFIED CONGENITAL MALFORMATIONS OF PERIPHERAL VASCULAR SYSTEM: ICD-10-CM

## 2022-06-28 DIAGNOSIS — L81.4 OTHER MELANIN HYPERPIGMENTATION: ICD-10-CM

## 2022-06-28 DIAGNOSIS — B35.1 TINEA UNGUIUM: ICD-10-CM | Status: INADEQUATELY CONTROLLED

## 2022-06-28 DIAGNOSIS — L70.8 OTHER ACNE: ICD-10-CM

## 2022-06-28 DIAGNOSIS — Z85.828 PERSONAL HISTORY OF OTHER MALIGNANT NEOPLASM OF SKIN: ICD-10-CM

## 2022-06-28 DIAGNOSIS — D18.0 HEMANGIOMA: ICD-10-CM

## 2022-06-28 DIAGNOSIS — D22 MELANOCYTIC NEVI: ICD-10-CM

## 2022-06-28 DIAGNOSIS — L82.1 OTHER SEBORRHEIC KERATOSIS: ICD-10-CM

## 2022-06-28 PROBLEM — D18.01 HEMANGIOMA OF SKIN AND SUBCUTANEOUS TISSUE: Status: ACTIVE | Noted: 2022-06-28

## 2022-06-28 PROBLEM — D22.5 MELANOCYTIC NEVI OF TRUNK: Status: ACTIVE | Noted: 2022-06-28

## 2022-06-28 PROCEDURE — ? ADDITIONAL NOTES

## 2022-06-28 PROCEDURE — ? SUNSCREEN RECOMMENDATIONS

## 2022-06-28 PROCEDURE — 99214 OFFICE O/P EST MOD 30 MIN: CPT

## 2022-06-28 PROCEDURE — ? COUNSELING

## 2022-06-28 PROCEDURE — ? NAIL CLIPPING FOR PATHOLOGY

## 2022-06-28 PROCEDURE — ? PRESCRIPTION

## 2022-06-28 PROCEDURE — ? TREATMENT REGIMEN

## 2022-06-28 RX ORDER — PHARMACY COMPOUNDING ACCESSORY
EACH MISCELLANEOUS
Qty: 1 | Refills: 3 | Status: ERX

## 2022-06-28 RX ORDER — CICLOPIROX 80 MG/ML
SOLUTION TOPICAL
Qty: 6.6 | Refills: 8 | Status: ERX

## 2022-06-28 ASSESSMENT — LOCATION DETAILED DESCRIPTION DERM
LOCATION DETAILED: RIGHT POSTERIOR SHOULDER
LOCATION DETAILED: RIGHT MIDDLE FINGERNAIL
LOCATION DETAILED: RIGHT MEDIAL UPPER BACK
LOCATION DETAILED: LEFT INFERIOR POSTERIOR HELIX
LOCATION DETAILED: INFERIOR THORACIC SPINE
LOCATION DETAILED: RIGHT LATERAL MALAR CHEEK
LOCATION DETAILED: LEFT SUPERIOR MEDIAL UPPER BACK

## 2022-06-28 ASSESSMENT — LOCATION SIMPLE DESCRIPTION DERM
LOCATION SIMPLE: RIGHT CHEEK
LOCATION SIMPLE: RIGHT SHOULDER
LOCATION SIMPLE: RIGHT UPPER BACK
LOCATION SIMPLE: RIGHT MIDDLE FINGERNAIL
LOCATION SIMPLE: LEFT EAR
LOCATION SIMPLE: UPPER BACK
LOCATION SIMPLE: LEFT UPPER BACK

## 2022-06-28 ASSESSMENT — LOCATION ZONE DERM
LOCATION ZONE: FACE
LOCATION ZONE: ARM
LOCATION ZONE: EAR
LOCATION ZONE: TRUNK
LOCATION ZONE: FINGERNAIL

## 2022-12-20 ENCOUNTER — APPOINTMENT (RX ONLY)
Dept: URBAN - METROPOLITAN AREA CLINIC 141 | Facility: CLINIC | Age: 87
Setting detail: DERMATOLOGY
End: 2022-12-20

## 2022-12-20 DIAGNOSIS — T300 ERYTHEMA [FIRST DEGREE], UNSPECIFIED SITE: ICD-10-CM

## 2022-12-20 DIAGNOSIS — L72.0 EPIDERMAL CYST: ICD-10-CM

## 2022-12-20 DIAGNOSIS — Q27.8 OTHER SPECIFIED CONGENITAL MALFORMATIONS OF PERIPHERAL VASCULAR SYSTEM: ICD-10-CM

## 2022-12-20 DIAGNOSIS — L60.8 OTHER NAIL DISORDERS: ICD-10-CM

## 2022-12-20 DIAGNOSIS — L60.3 NAIL DYSTROPHY: ICD-10-CM

## 2022-12-20 PROBLEM — T23.102A BURN OF FIRST DEGREE OF LEFT HAND, UNSPECIFIED SITE, INITIAL ENCOUNTER: Status: ACTIVE | Noted: 2022-12-20

## 2022-12-20 PROCEDURE — ? ADDITIONAL NOTES

## 2022-12-20 PROCEDURE — ? COUNSELING

## 2022-12-20 PROCEDURE — 99214 OFFICE O/P EST MOD 30 MIN: CPT

## 2022-12-20 PROCEDURE — ? PRESCRIPTION MEDICATION MANAGEMENT

## 2022-12-20 PROCEDURE — ? FULL BODY SKIN EXAM - DECLINED

## 2022-12-20 PROCEDURE — ? PRESCRIPTION

## 2022-12-20 RX ORDER — MUPIROCIN 20 MG/G
OINTMENT TOPICAL
Qty: 22 | Refills: 0 | Status: ERX | COMMUNITY
Start: 2022-12-20

## 2022-12-20 RX ADMIN — MUPIROCIN: 20 OINTMENT TOPICAL at 00:00

## 2022-12-20 ASSESSMENT — LOCATION SIMPLE DESCRIPTION DERM
LOCATION SIMPLE: RIGHT MIDDLE FINGERNAIL
LOCATION SIMPLE: RIGHT CHEEK
LOCATION SIMPLE: LEFT HAND
LOCATION SIMPLE: LEFT SMALL FINGER
LOCATION SIMPLE: LEFT MIDDLE FINGERNAIL

## 2022-12-20 ASSESSMENT — LOCATION DETAILED DESCRIPTION DERM
LOCATION DETAILED: LEFT MIDDLE FINGERNAIL
LOCATION DETAILED: LEFT PROXIMAL DORSAL SMALL FINGER
LOCATION DETAILED: RIGHT CENTRAL MALAR CHEEK
LOCATION DETAILED: RIGHT MIDDLE FINGERNAIL
LOCATION DETAILED: LEFT RADIAL DORSAL HAND

## 2022-12-20 ASSESSMENT — LOCATION ZONE DERM
LOCATION ZONE: FINGER
LOCATION ZONE: HAND
LOCATION ZONE: FINGERNAIL
LOCATION ZONE: FACE

## 2022-12-20 NOTE — PROCEDURE: PRESCRIPTION MEDICATION MANAGEMENT
Render In Strict Bullet Format?: No
Detail Level: Zone
Initiate Treatment: Mupirocin
Initiate Treatment: Mupirocin oint

## 2024-07-24 ENCOUNTER — APPOINTMENT (RX ONLY)
Dept: URBAN - METROPOLITAN AREA CLINIC 145 | Facility: CLINIC | Age: 89
Setting detail: DERMATOLOGY
End: 2024-07-24

## 2024-07-24 DIAGNOSIS — B35.1 TINEA UNGUIUM: ICD-10-CM

## 2024-07-24 DIAGNOSIS — L03.01 CELLULITIS OF FINGER: ICD-10-CM

## 2024-07-24 PROBLEM — L03.011 CELLULITIS OF RIGHT FINGER: Status: ACTIVE | Noted: 2024-07-24

## 2024-07-24 PROCEDURE — ? COUNSELING

## 2024-07-24 PROCEDURE — ? ADDITIONAL NOTES

## 2024-07-24 PROCEDURE — 99214 OFFICE O/P EST MOD 30 MIN: CPT

## 2024-07-24 PROCEDURE — ? PRESCRIPTION

## 2024-07-24 RX ORDER — CICLOPIROX 80 MG/ML
SOLUTION TOPICAL
Qty: 6.6 | Refills: 9 | Status: ERX | COMMUNITY
Start: 2024-07-24

## 2024-07-24 RX ADMIN — CICLOPIROX: 80 SOLUTION TOPICAL at 00:00

## 2024-07-24 ASSESSMENT — LOCATION SIMPLE DESCRIPTION DERM
LOCATION SIMPLE: LEFT MIDDLE FINGERNAIL
LOCATION SIMPLE: RIGHT MIDDLE FINGER
LOCATION SIMPLE: RIGHT MIDDLE FINGERNAIL

## 2024-07-24 ASSESSMENT — LOCATION ZONE DERM
LOCATION ZONE: FINGERNAIL
LOCATION ZONE: FINGER

## 2024-07-24 ASSESSMENT — LOCATION DETAILED DESCRIPTION DERM
LOCATION DETAILED: RIGHT MIDDLE FINGER DISTAL INTERPHALANGEAL JOINT
LOCATION DETAILED: RIGHT MIDDLE FINGERNAIL
LOCATION DETAILED: LEFT MIDDLE FINGERNAIL

## 2024-07-24 NOTE — PROCEDURE: ADDITIONAL NOTES
Render Risk Assessment In Note?: no
Detail Level: Zone
Additional Notes: Advise vinegar soaks 10 mins 3x a day

## 2025-01-27 ENCOUNTER — APPOINTMENT (OUTPATIENT)
Dept: URBAN - METROPOLITAN AREA CLINIC 162 | Facility: CLINIC | Age: 89
Setting detail: DERMATOLOGY
End: 2025-01-27

## 2025-01-27 DIAGNOSIS — L72.0 EPIDERMAL CYST: ICD-10-CM

## 2025-01-27 DIAGNOSIS — L70.8 OTHER ACNE: ICD-10-CM

## 2025-01-27 DIAGNOSIS — L81.4 OTHER MELANIN HYPERPIGMENTATION: ICD-10-CM

## 2025-01-27 DIAGNOSIS — D22 MELANOCYTIC NEVI: ICD-10-CM

## 2025-01-27 DIAGNOSIS — D18.0 HEMANGIOMA: ICD-10-CM

## 2025-01-27 DIAGNOSIS — Z85.828 PERSONAL HISTORY OF OTHER MALIGNANT NEOPLASM OF SKIN: ICD-10-CM

## 2025-01-27 DIAGNOSIS — B35.1 TINEA UNGUIUM: ICD-10-CM

## 2025-01-27 DIAGNOSIS — Q27.8 OTHER SPECIFIED CONGENITAL MALFORMATIONS OF PERIPHERAL VASCULAR SYSTEM: ICD-10-CM

## 2025-01-27 DIAGNOSIS — L82.1 OTHER SEBORRHEIC KERATOSIS: ICD-10-CM

## 2025-01-27 PROBLEM — D18.01 HEMANGIOMA OF SKIN AND SUBCUTANEOUS TISSUE: Status: ACTIVE | Noted: 2025-01-27

## 2025-01-27 PROBLEM — D22.5 MELANOCYTIC NEVI OF TRUNK: Status: ACTIVE | Noted: 2025-01-27

## 2025-01-27 PROCEDURE — ? PRESCRIPTION MEDICATION MANAGEMENT

## 2025-01-27 PROCEDURE — ? COUNSELING

## 2025-01-27 PROCEDURE — 99214 OFFICE O/P EST MOD 30 MIN: CPT

## 2025-01-27 PROCEDURE — ? TREATMENT REGIMEN

## 2025-01-27 PROCEDURE — ? PRESCRIPTION

## 2025-01-27 PROCEDURE — ? SUNSCREEN RECOMMENDATIONS

## 2025-01-27 PROCEDURE — ? ADDITIONAL NOTES

## 2025-01-27 RX ORDER — CICLOPIROX 80 MG/ML
SOLUTION TOPICAL
Qty: 6.6 | Refills: 5 | Status: ERX

## 2025-01-27 ASSESSMENT — LOCATION SIMPLE DESCRIPTION DERM
LOCATION SIMPLE: LEFT EAR
LOCATION SIMPLE: UPPER BACK
LOCATION SIMPLE: RIGHT SHOULDER
LOCATION SIMPLE: RIGHT MIDDLE FINGERNAIL
LOCATION SIMPLE: RIGHT UPPER BACK
LOCATION SIMPLE: LEFT UPPER BACK
LOCATION SIMPLE: CHEST
LOCATION SIMPLE: RIGHT CHEEK

## 2025-01-27 ASSESSMENT — LOCATION ZONE DERM
LOCATION ZONE: EAR
LOCATION ZONE: TRUNK
LOCATION ZONE: FACE
LOCATION ZONE: ARM
LOCATION ZONE: FINGERNAIL

## 2025-01-27 ASSESSMENT — LOCATION DETAILED DESCRIPTION DERM
LOCATION DETAILED: RIGHT POSTERIOR SHOULDER
LOCATION DETAILED: RIGHT LATERAL MALAR CHEEK
LOCATION DETAILED: RIGHT MEDIAL UPPER BACK
LOCATION DETAILED: LEFT SUPERIOR MEDIAL UPPER BACK
LOCATION DETAILED: RIGHT MEDIAL SUPERIOR CHEST
LOCATION DETAILED: INFERIOR THORACIC SPINE
LOCATION DETAILED: LEFT INFERIOR POSTERIOR HELIX
LOCATION DETAILED: RIGHT MIDDLE FINGERNAIL

## 2025-04-11 NOTE — ASU PREOP CHECKLIST - 1.
1155 Referral for Surgery updated per Dr Mckenna note to refer to Dr Finch and Oncology Dr Brenna Coburn.  Secure chat and EPIC message sent to Dr Finch with patient referral.  Secure Chat sent to Martina Deras MA to assist with scheduling referral with Dr Coburn.    1200 Support call to patient after notification of positive breast biopsy.  Patient states she met with Dr Mckenna this morning and was informed she has breast cancer, states it was a lot to hear and is feeling emotional, emotional support provided.  I explained the Nurse Navigator role to the patient as well as explained the process for scheduling testing, including consultations with Specialists including Surgery, medical and radiation oncology, any additional testing that may be ordered and informed the patient we are here to support her during this time.  I inquired about family history and breast cancer, pt states she can't recall anyone in her family has been diagnosed with breast cancer, but has a paternal cousin that was dx with colon cancer.  Patient states she has family and friends that are very supportive and will be available as needed.  Patient informed of available resources/ support groups.   Patient provided with Nurse Navigator contact information and encouraged to call with any needs/ questions/ concerns- appreciative of call.           1400  Secure Chat message received from Dr Finch to assist pt in getting scheduled for MRI  Call to patient, scheduled 04/30 for MRI.   blanket given

## 2025-06-10 ENCOUNTER — EMERGENCY (EMERGENCY)
Facility: HOSPITAL | Age: 89
LOS: 1 days | End: 2025-06-10
Attending: STUDENT IN AN ORGANIZED HEALTH CARE EDUCATION/TRAINING PROGRAM | Admitting: STUDENT IN AN ORGANIZED HEALTH CARE EDUCATION/TRAINING PROGRAM
Payer: MEDICARE

## 2025-06-10 VITALS
RESPIRATION RATE: 17 BRPM | WEIGHT: 104.06 LBS | OXYGEN SATURATION: 98 % | SYSTOLIC BLOOD PRESSURE: 141 MMHG | HEART RATE: 62 BPM | TEMPERATURE: 98 F | HEIGHT: 62 IN | DIASTOLIC BLOOD PRESSURE: 87 MMHG

## 2025-06-10 DIAGNOSIS — K64.9 UNSPECIFIED HEMORRHOIDS: Chronic | ICD-10-CM

## 2025-06-10 DIAGNOSIS — Z90.89 ACQUIRED ABSENCE OF OTHER ORGANS: Chronic | ICD-10-CM

## 2025-06-10 DIAGNOSIS — H26.9 UNSPECIFIED CATARACT: Chronic | ICD-10-CM

## 2025-06-10 PROCEDURE — 99283 EMERGENCY DEPT VISIT LOW MDM: CPT

## 2025-06-10 PROCEDURE — 99284 EMERGENCY DEPT VISIT MOD MDM: CPT

## 2025-06-10 RX ORDER — SILVER NITRATE
1 CRYSTALS MISCELLANEOUS ONCE
Refills: 0 | Status: COMPLETED | OUTPATIENT
Start: 2025-06-10 | End: 2025-06-10

## 2025-06-10 RX ADMIN — Medication 1 APPLICATION(S): at 11:00

## 2025-06-10 NOTE — ED PROVIDER NOTE - PATIENT PORTAL LINK FT
You can access the FollowMyHealth Patient Portal offered by [facility] by registering at the following website: [email or web address]. By joining [employer]’s FollowMyHealth portal, you will also be able to view your health information using other applications (apps) compatible with our system.

## 2025-06-10 NOTE — ED ADULT NURSE NOTE - OBJECTIVE STATEMENT
89 yr old female to ED for complaints of bleeding from right ankle. Patient with history of varicose veins. Bleeding controlled. Pt. denies SOB, headache, fever/chills, abdominal pain, n/v/d, chest pain, weakness/fatigue, Safety and comfort provided.

## 2025-06-10 NOTE — ED ADULT NURSE NOTE - NSICDXPASTMEDICALHX_GEN_ALL_CORE_FT
PAST MEDICAL HISTORY:  MVP (mitral valve prolapse)     Osteoporosis     Polyp of corpus uteri     Thyroid nodule h/o

## 2025-06-10 NOTE — ED ADULT TRIAGE NOTE - CHIEF COMPLAINT QUOTE
Pt with spontaneous bleeding from right ankle, possible varicose vein as per son. Bleeding controlled. Pt takes eliquis

## 2025-06-10 NOTE — ED ADULT NURSE NOTE - NSFALLHARMRISKINTERV_ED_ALL_ED

## 2025-06-10 NOTE — ED PROVIDER NOTE - OBJECTIVE STATEMENT
89 yo f on a/c presents with bleeding wound. As per pt and son at bedside, pt began having bleeding from superficial varicose vein of RT ankle. Denies dizziness, ha, trauma, weakness

## 2025-06-10 NOTE — ED PROVIDER NOTE - PHYSICAL EXAMINATION
CONSTITUTIONAL: NAD  SKIN: Warm dry  HEAD: NCAT  EYES: NL inspection  ENT: MMM  EXT: no pedal edema BL  +RT ankle dorsal small point  area of oozing bleeding from superificial varicose vein  NEURO: Grossly unremarkable  PSYCH: Cooperative, appropriate.

## 2025-06-10 NOTE — ED ADULT NURSE NOTE - NSICDXFAMILYHX_GEN_ALL_CORE_FT
FAMILY HISTORY:  Father  Still living? No  Family history of emphysema, Age at diagnosis: Age Unknown    Mother  Still living? Unknown  Family history of breast cancer, Age at diagnosis: Age Unknown    Sibling  Still living? No  Family history of breast cancer, Age at diagnosis: Age Unknown  Family history of oral cancer, Age at diagnosis: Age Unknown    Child  Still living? Unknown  Family history of diabetes mellitus, Age at diagnosis: Age Unknown    Uncle  Still living? Unknown  Family history of diabetes mellitus, Age at diagnosis: Age Unknown

## 2025-06-10 NOTE — ED PROVIDER NOTE - CLINICAL SUMMARY MEDICAL DECISION MAKING FREE TEXT BOX
89 yo f on a/c presents with bleeding wound. As per pt and son at bedside, pt began having bleeding from superficial varicose vein of RT ankle. Denies dizziness, ha, trauma, weakness   Exam as stated   Wound brought in with tape pressure dressing   Oozing bleeding began to self clot, cautery applied. Bleeding subsided. Placed adaptic dressing over and dressed w kerlex   Patient to be discharged from ED. Any available test results were discussed with patient and/or family. Verbal instructions given, including instructions to return to ED immediately for any new, worsening, or concerning symptoms. Patient endorsed understanding. Written discharge instructions additionally given, including follow-up plan.

## 2025-06-16 NOTE — CHART NOTE - NSCHARTNOTEFT_GEN_A_CORE
89 y o presenting to the ED on 6/10 with bleeding from superficial varicose vein as per chart.  SW called to assist with the recommended primary care follow up appointment and received no answer.  Contact information was provided via voicemail.